# Patient Record
Sex: FEMALE | Race: ASIAN | NOT HISPANIC OR LATINO | URBAN - METROPOLITAN AREA
[De-identification: names, ages, dates, MRNs, and addresses within clinical notes are randomized per-mention and may not be internally consistent; named-entity substitution may affect disease eponyms.]

---

## 2017-07-19 ENCOUNTER — OFFICE VISIT (OUTPATIENT)
Dept: FAMILY MEDICINE | Facility: CLINIC | Age: 37
End: 2017-07-19
Payer: COMMERCIAL

## 2017-07-19 VITALS
HEIGHT: 63 IN | OXYGEN SATURATION: 98 % | SYSTOLIC BLOOD PRESSURE: 96 MMHG | RESPIRATION RATE: 18 BRPM | BODY MASS INDEX: 21.17 KG/M2 | DIASTOLIC BLOOD PRESSURE: 63 MMHG | WEIGHT: 119.5 LBS | HEART RATE: 74 BPM | TEMPERATURE: 98.2 F

## 2017-07-19 DIAGNOSIS — D17.30 LIPOMA OF SKIN AND SUBCUTANEOUS TISSUE: ICD-10-CM

## 2017-07-19 DIAGNOSIS — Z00.00 ENCOUNTER FOR ROUTINE ADULT HEALTH EXAMINATION WITHOUT ABNORMAL FINDINGS: Primary | ICD-10-CM

## 2017-07-19 DIAGNOSIS — N32.81 OVERACTIVE BLADDER: ICD-10-CM

## 2017-07-19 DIAGNOSIS — M25.511 RIGHT SHOULDER PAIN, UNSPECIFIED CHRONICITY: ICD-10-CM

## 2017-07-19 DIAGNOSIS — F41.1 GENERALIZED ANXIETY DISORDER: ICD-10-CM

## 2017-07-19 PROCEDURE — 99395 PREV VISIT EST AGE 18-39: CPT | Performed by: NURSE PRACTITIONER

## 2017-07-19 RX ORDER — TROSPIUM CHLORIDE 20 MG/1
20 TABLET, FILM COATED ORAL AT BEDTIME
Qty: 90 TABLET | Status: SHIPPED | OUTPATIENT
Start: 2017-07-19 | End: 2018-07-29

## 2017-07-19 RX ORDER — PAROXETINE 40 MG/1
40 TABLET, FILM COATED ORAL EVERY MORNING
Qty: 90 TABLET | Status: SHIPPED | OUTPATIENT
Start: 2017-07-19 | End: 2018-07-29

## 2017-07-19 RX ORDER — QUETIAPINE FUMARATE 50 MG/1
50 TABLET, FILM COATED ORAL AT BEDTIME
Qty: 120 TABLET | Status: SHIPPED | OUTPATIENT
Start: 2017-07-19 | End: 2018-07-29

## 2017-07-19 NOTE — MR AVS SNAPSHOT
After Visit Summary   7/19/2017    Mikaela Esquivel    MRN: [id number]           Patient Information     Date Of Birth          1980        Visit Information        Provider Department      7/19/2017 11:30 AM Susan Booker NP [facility]        Today's Diagnoses     Encounter for routine adult health examination without abnormal findings    -  1    GENERALIZED ANXIETY DIS        Overactive bladder        Right shoulder pain, unspecified chronicity        Lipoma of skin and subcutaneous tissue          Care Instructions    Ice shoulder for 20 minutes 3 times a day  Ibuprofen 600 mg (3 tablets) 3 times a day with food for one week.           Follow-ups after your visit        Who to contact     If you have questions or need follow up information about today's clinic visit or your schedule please contact [facility] directly at [phone].  Normal or non-critical lab and imaging results will be communicated to you by [employer], letter or phone within 4 business days after the clinic has received the results. If you do not hear from us within 7 days, please contact the clinic through [employer] or phone. If you have a critical or abnormal lab result, we will notify you by phone as soon as possible.  Submit refill requests through [employer] or call your pharmacy and they will forward the refill request to us. Please allow 3 business days for your refill to be completed.          Additional Information About Your Visit        MyChart Information     [employer] gives you secure access to your electronic health record. If you see a primary care provider, you can also send messages to your care team and make appointments. If you have questions, please call your primary care clinic.  If you do not have a primary care provider, please call [phone] and they will assist you.        Care EveryWhere ID     This is your Care EveryWhere ID. This could be used by other  "organizations to access your [place] medical records  [id number]        Your Vitals Were     Pulse Temperature Respirations Height Last Period Pulse Oximetry    74 98.2  F (36.8  C) (Oral) 18 5' 3\" (1.6 m) 06/25/2017 (Approximate) 98%    BMI (Body Mass Index)                   21.17 kg/m2            Blood Pressure from Last 3 Encounters:   07/19/17 96/63   05/04/16 100/74   07/30/15 110/77    Weight from Last 3 Encounters:   07/19/17 119 lb 8 oz (54.2 kg)   05/04/16 125 lb (56.7 kg)   07/30/15 124 lb 2 oz (56.3 kg)              Today, you had the following     No orders found for display         Where to get your medicines      These medications were sent to [place] [address] [place] [facility] [address]  [address] [facility] [address]     Phone:  [phone]     PARoxetine 40 MG tablet    trospium 20 MG tablet          Primary Care Provider Office Phone # Fax #    Susan Booker [phone]       [facility] [address] [facility] [address]        Equal Access to Services     BRENDA ARENAS : Hadii aad ku hadasho Soomaali, waaxda luqadaha, qaybta kaalmada adeegyada, waxay idiin haymunan lazarus gould . So [facility] [phone].    ATENCIÓN: Si habla español, tiene a aguillon disposición servicios gratuitos de asistencia lingüística. Llame al [phone].    We comply with applicable federal civil rights laws and [place] laws. We do not discriminate on the basis of race, color, national origin, age, disability sex, sexual orientation or gender identity.            Thank you!     Thank you for choosing [facility]  for your care. Our goal is always to provide you with excellent care. Hearing back from our patients is one way we can continue to improve our services. Please take a few minutes to complete the written survey that you may receive in the mail after your visit with us. Thank you!             Your Updated Medication List - " Protect others around you: Learn how to safely use, store and throw away your medicines at www.disposemymeds.org.          This list is accurate as of: 7/19/17 12:34 PM.  Always use your most recent med list.                   Brand Name Dispense Instructions for use Diagnosis    PARoxetine 40 MG tablet    PAXIL    90 tablet    Take 1 tablet (40 mg) by mouth every morning    Generalized anxiety disorder       SEROQUEL PO      Take by mouth At Bedtime        trospium 20 MG tablet    SANCTURA    90 tablet    Take 1 tablet (20 mg) by mouth At Bedtime    Overactive bladder

## 2017-07-19 NOTE — PATIENT INSTRUCTIONS
Ice shoulder for 20 minutes 3 times a day  Ibuprofen 600 mg (3 tablets) 3 times a day with food for one week.

## 2017-07-19 NOTE — PROGRESS NOTES
SUBJECTIVE:   CC: Mikaela Esquivel is an 37 year old woman who presents for preventive health visit.     Physical   Annual:     Getting at least 3 servings of Calcium per day::  Yes    Bi-annual eye exam::  NO    Dental care twice a year::  NO    Sleep apnea or symptoms of sleep apnea::  Daytime drowsiness    Frequency of exercise::  2-3 days/week    Duration of exercise::  45-60 minutes    Taking medications regularly::  Yes    Medication side effects::  Other    Additional concerns today::  YES    She has been having right shoulder pain for the past 6 months.  She plays ultimate frisbee.  It is more bothersome when taking off a shirt.  No change over time.  No radiating pain, paresthesias, weakness, neck pain.  Has not tried anything.    The lipoma on her back has gotten slightly larger.  It is not causing any pain.             Today's PHQ-2 Score:   PHQ-2 ( 1999 [employer]) 7/19/2017   Q1: Little interest or pleasure in doing things 0   Q2: Feeling down, depressed or hopeless 1   PHQ-2 Score 1   Q1: Little interest or pleasure in doing things Not at all   Q2: Feeling down, depressed or hopeless Several days   PHQ-2 Score 1       Abuse: Current or Past(Physical, Sexual or Emotional)- No  Do you feel safe in your environment - Yes    Social History   Substance Use Topics     Smoking status: Never Smoker     Smokeless tobacco: Never Used     Alcohol use Yes      Comment: occasionally     The patient does not drink >3 drinks per day nor >7 drinks per week.    Reviewed orders with patient.  Reviewed health maintenance and updated orders accordingly - Yes          Pertinent mammograms are reviewed under the imaging tab.  History of abnormal Pap smear: NO - age 30- 65 PAP every 3 years recommended    Reviewed and updated as needed this visit by clinical staff  Tobacco  Allergies  Meds  Med Hx  Surg Hx  Fam Hx  Soc Hx        Reviewed and updated as needed this visit by Provider              ROS:  C: NEGATIVE for  "fever, chills, change in weight  I: NEGATIVE for worrisome rashes, moles or lesions  E: NEGATIVE for vision changes or irritation  ENT: NEGATIVE for ear, mouth and throat problems  R: NEGATIVE for significant cough or SOB  B: NEGATIVE for masses, tenderness or discharge  CV: NEGATIVE for chest pain, palpitations or peripheral edema  GI: NEGATIVE for nausea, abdominal pain, heartburn, or change in bowel habits  : NEGATIVE for unusual urinary or vaginal symptoms. Periods are regular.  MUSCULOSKELETAL:see HPI  N: NEGATIVE for weakness, dizziness or paresthesias  P: NEGATIVE for changes in mood or affect     OBJECTIVE:   BP 96/63  Pulse 74  Temp 98.2  F (36.8  C) (Oral)  Resp 18  Ht 5' 3\" (1.6 m)  Wt 119 lb 8 oz (54.2 kg)  LMP 06/25/2017 (Approximate)  SpO2 98%  BMI 21.17 kg/m2  EXAM:  GENERAL: healthy, alert and no distress  EYES: Eyes grossly normal to inspection, PERRL and conjunctivae and sclerae normal  HENT: ear canals and TM's normal, nose and mouth without ulcers or lesions  NECK: no adenopathy, no asymmetry, masses, or scars and thyroid normal to palpation  RESP: lungs clear to auscultation - no rales, rhonchi or wheezes  BREAST: normal without masses, tenderness or nipple discharge and no palpable axillary masses or adenopathy  CV: regular rate and rhythm, normal S1 S2, no S3 or S4, no murmur, click or rub, no peripheral edema and peripheral pulses strong  ABDOMEN: soft, nontender, no hepatosplenomegaly, no masses and bowel sounds normal  MS: mild tenderness of anterior right shoulder, normal strength, pain with internal rotation  SKIN: fluctuant mass left upper back, approx 6-7 cm in diameter, consistent with a lipoma  NEURO: Normal strength and tone, mentation intact and speech normal  PSYCH: mentation appears normal, affect normal/bright    ASSESSMENT/PLAN:   1. Encounter for routine adult health examination without abnormal findings      2. GENERALIZED ANXIETY DIS  The current medical regimen is " "effective;  continue present plan and medications.   - PARoxetine (PAXIL) 40 MG tablet; Take 1 tablet (40 mg) by mouth every morning  Dispense: 90 tablet; Refill: PRN  - QUEtiapine (SEROQUEL) 50 MG tablet; Take 1 tablet (50 mg) by mouth At Bedtime  Dispense: 120 tablet; Refill: PRN    3. Overactive bladder  Refills given.   - trospium (SANCTURA) 20 MG tablet; Take 1 tablet (20 mg) by mouth At Bedtime  Dispense: 90 tablet; Refill: PRN    4. Right shoulder pain, unspecified chronicity  Ibuprofen 600mg TID for 7-10 days and ice area for 20 minutes TID   She will call if she would like a referral to PT.    5. Lipoma of skin and subcutaneous tissue  Discussed the option of a surgeon referral, which she declines.  If size rapidly enlarges, or causes pain, she will follow up.       COUNSELING:  Reviewed preventive health counseling, as reflected in patient instructions         reports that she has never smoked. She has never used smokeless tobacco.    Estimated body mass index is 21.17 kg/(m^2) as calculated from the following:    Height as of this encounter: 5' 3\" (1.6 m).    Weight as of this encounter: 119 lb 8 oz (54.2 kg).         Counseling Resources:  ATP IV Guidelines  Pooled Cohorts Equation Calculator  Breast Cancer Risk Calculator  FRAX Risk Assessment  ICSI Preventive Guidelines  Dietary Guidelines for Americans, 2010  USDA's MyPlate  ASA Prophylaxis  Lung CA Screening    Susan Booker NP  [facility] for HPI/ROS submitted by the patient on 7/19/2017   PHQ-2 Score: 1    "

## 2017-07-19 NOTE — NURSING NOTE
"Chief Complaint   Patient presents with     Physical     not fasting      Shoulder Pain     R shoulder x 6 months      Forms       Initial BP 96/63  Pulse 74  Temp 98.2  F (36.8  C) (Oral)  Resp 18  Ht 5' 3\" (1.6 m)  Wt 119 lb 8 oz (54.2 kg)  LMP 06/25/2017 (Approximate)  SpO2 98%  BMI 21.17 kg/m2 Estimated body mass index is 21.17 kg/(m^2) as calculated from the following:    Height as of this encounter: 5' 3\" (1.6 m).    Weight as of this encounter: 119 lb 8 oz (54.2 kg).  Medication Reconciliation: complete       Camilla Baron MA      "

## 2018-07-29 DIAGNOSIS — N32.81 OVERACTIVE BLADDER: ICD-10-CM

## 2018-07-29 DIAGNOSIS — F41.1 GENERALIZED ANXIETY DISORDER: ICD-10-CM

## 2018-07-29 NOTE — TELEPHONE ENCOUNTER
"Requested Prescriptions   Pending Prescriptions Disp Refills     PARoxetine (PAXIL) 40 MG tablet [Pharmacy Med Name: PAROXETINE 40MG TABLETS]  Last Written Prescription Date:  7/19/17  Last Fill Quantity: 90,  # refills: PRN   Last office visit: 7/19/2017 with prescribing provider:     Future Office Visit:     90 tablet 0     Sig: TAKE 1 TABLET BY MOUTH EVERY MORNING    SSRIs Protocol Failed    7/29/2018  3:24 AM       Failed - Recent (12 mo) or future (30 days) visit within the authorizing provider's specialty    Patient had office visit in the last 12 months or has a visit in the next 30 days with authorizing provider or within the authorizing provider's specialty.  See \"Patient Info\" tab in inbasket, or \"Choose Columns\" in Meds & Orders section of the refill encounter.           Passed - Patient is age 18 or older       Passed - No active pregnancy on record       Passed - No positive pregnancy test in last 12 months        trospium (SANCTURA) 20 MG tablet [Pharmacy Med Name: TROSPIUM CL 20MG TABLETS]  Last Written Prescription Date:  7/19/17  Last Fill Quantity: 90,  # refills: PRN   Last office visit: 7/19/2017 with prescribing provider:     Future Office Visit:   90 tablet 0     Sig: TAKE 1 TABLET BY MOUTH EVERY NIGHT AT BEDTIME    Muscarinic Antagonists (Urinary Incontinence Agents) Failed    7/29/2018  3:24 AM       Failed - Recent (12 mo) or future (30 days) visit within the authorizing provider's specialty    Patient had office visit in the last 12 months or has a visit in the next 30 days with authorizing provider or within the authorizing provider's specialty.  See \"Patient Info\" tab in inbasket, or \"Choose Columns\" in Meds & Orders section of the refill encounter.         Passed - Patient does not have a diagnosis of glaucoma on the problem list    If glaucoma diagnosis is new, refer refill to physician.         Passed - Patient is 18 years of age or older        QUEtiapine (SEROQUEL) 50 MG tablet " "[Pharmacy Med Name: QUETIAPINE 50MG TABLETS]  Last Written Prescription Date:  7/19/17  Last Fill Quantity: 120,  # refills: PRN   Last office visit: 7/19/2017 with prescribing provider:     Future Office Visit:   30 tablet 0     Sig: TAKE 1 TABLET BY MOUTH EVERY NIGHT AT BEDTIME    Antipsychotic Medications Failed    7/29/2018  3:24 AM       Failed - Blood pressure under 140/90 in past 12 months    BP Readings from Last 3 Encounters:   07/19/17 96/63   05/04/16 100/74   07/30/15 110/77          Failed - Lipid panel on file within the past 12 months    Recent Labs   Lab Test  06/25/14   1332   CHOL  189   TRIG  109   HDL  64   LDL  103   VLDL  22   CHOLHDLRATIO  2.9          Failed - CBC on file in past 12 months    No lab results found.    For [facility] ONLY: [id number] = WBC, [id number] = RBC         Failed - Heart Rate on file within past 12 months    Pulse Readings from Last 3 Encounters:   07/19/17 74   05/04/16 83   07/30/15 64          Failed - A1c or Glucose on file in past 12 months    No lab results found.  Please review patients last 3 weights. If a weight gain of >10 lbs exists, you may refill the prescription once after instructing the patient to schedule an appointment within the next 30 days.  Wt Readings from Last 3 Encounters:   07/19/17 119 lb 8 oz (54.2 kg)   05/04/16 125 lb (56.7 kg)   07/30/15 124 lb 2 oz (56.3 kg)          Failed - Recent (6 mo) or future (30 days) visit within the authorizing provider's specialty    Patient had office visit in the last 6 months or has a visit in the next 30 days with authorizing provider or within the authorizing provider's specialty.  See \"Patient Info\" tab in inbasket, or \"Choose Columns\" in Meds & Orders section of the refill encounter.         Passed - Patient is 12 years of age or older       Passed - Patient is not pregnant       Passed - No positve pregnancy test on file in past 12 months          "

## 2018-08-01 RX ORDER — PAROXETINE 40 MG/1
TABLET, FILM COATED ORAL
Qty: 30 TABLET | Refills: 0 | Status: SHIPPED | OUTPATIENT
Start: 2018-08-01 | End: 2018-09-04

## 2018-08-01 RX ORDER — TROSPIUM CHLORIDE 20 MG/1
TABLET, FILM COATED ORAL
Qty: 30 TABLET | Refills: 0 | Status: SHIPPED | OUTPATIENT
Start: 2018-08-01 | End: 2018-09-04

## 2018-08-01 RX ORDER — QUETIAPINE FUMARATE 50 MG/1
TABLET, FILM COATED ORAL
Qty: 30 TABLET | Refills: 0 | Status: SHIPPED | OUTPATIENT
Start: 2018-08-01 | End: 2018-09-04

## 2018-11-22 DIAGNOSIS — N32.81 OVERACTIVE BLADDER: ICD-10-CM

## 2018-11-23 NOTE — TELEPHONE ENCOUNTER
"Requested Prescriptions   Pending Prescriptions Disp Refills     trospium (SANCTURA) 20 MG tablet [Pharmacy Med Name: TROSPIUM CL 20MG TABLETS] 30 tablet 0      Last Written Prescription Date:  9/05/2018                                 Last Fill Quantity:30,  # refills: 3   Last Office Visit: 7/19/2017   Future Office Visit:    Next 5 appointments (look out 90 days)     Jan 07, 2019  8:30 AM CST   Office Visit with Susan Booker NP   [facility] ([address] [id number] [phone]                  Sig: TAKE 1 TABLET(20 MG) BY MOUTH AT BEDTIME    Muscarinic Antagonists (Urinary Incontinence Agents) Failed    11/22/2018  9:07 AM       Failed - Recent (12 mo) or future (30 days) visit within the authorizing provider's specialty    Patient had office visit in the last 12 months or has a visit in the next 30 days with authorizing provider or within the authorizing provider's specialty.  See \"Patient Info\" tab in inbasket, or \"Choose Columns\" in Meds & Orders section of the refill encounter.             Passed - Patient does not have a diagnosis of glaucoma on the problem list    If glaucoma diagnosis is new, refer refill to physician.         Passed - Patient is 18 years of age or older          "

## 2018-11-25 RX ORDER — TROSPIUM CHLORIDE 20 MG/1
TABLET, FILM COATED ORAL
Qty: 30 TABLET | Refills: 0 | OUTPATIENT
Start: 2018-11-25

## 2019-01-07 ENCOUNTER — OFFICE VISIT (OUTPATIENT)
Dept: FAMILY MEDICINE | Facility: CLINIC | Age: 39
End: 2019-01-07
Payer: COMMERCIAL

## 2019-01-07 ENCOUNTER — RESULT FOLLOW UP (OUTPATIENT)
Dept: FAMILY MEDICINE | Facility: CLINIC | Age: 39
End: 2019-01-07

## 2019-01-07 ENCOUNTER — TELEPHONE (OUTPATIENT)
Dept: FAMILY MEDICINE | Facility: CLINIC | Age: 39
End: 2019-01-07

## 2019-01-07 VITALS
HEART RATE: 97 BPM | SYSTOLIC BLOOD PRESSURE: 96 MMHG | BODY MASS INDEX: 20.51 KG/M2 | WEIGHT: 120.13 LBS | DIASTOLIC BLOOD PRESSURE: 74 MMHG | HEIGHT: 64 IN | OXYGEN SATURATION: 97 % | TEMPERATURE: 98.9 F

## 2019-01-07 DIAGNOSIS — F41.1 GENERALIZED ANXIETY DISORDER: ICD-10-CM

## 2019-01-07 DIAGNOSIS — J35.8 CRYPTIC TONSIL: ICD-10-CM

## 2019-01-07 DIAGNOSIS — D17.30 LIPOMA OF SKIN AND SUBCUTANEOUS TISSUE: ICD-10-CM

## 2019-01-07 DIAGNOSIS — N32.81 OVERACTIVE BLADDER: ICD-10-CM

## 2019-01-07 DIAGNOSIS — Z00.00 ENCOUNTER FOR ROUTINE ADULT HEALTH EXAMINATION WITHOUT ABNORMAL FINDINGS: Primary | ICD-10-CM

## 2019-01-07 DIAGNOSIS — R87.810 CERVICAL HIGH RISK HPV (HUMAN PAPILLOMAVIRUS) TEST POSITIVE: ICD-10-CM

## 2019-01-07 PROCEDURE — G0145 SCR C/V CYTO,THINLAYER,RESCR: HCPCS | Performed by: NURSE PRACTITIONER

## 2019-01-07 PROCEDURE — 99395 PREV VISIT EST AGE 18-39: CPT | Performed by: NURSE PRACTITIONER

## 2019-01-07 PROCEDURE — 87624 HPV HI-RISK TYP POOLED RSLT: CPT | Performed by: NURSE PRACTITIONER

## 2019-01-07 RX ORDER — PAROXETINE 40 MG/1
40 TABLET, FILM COATED ORAL DAILY
Qty: 30 TABLET | Status: SHIPPED | OUTPATIENT
Start: 2019-01-07 | End: 2020-02-04

## 2019-01-07 RX ORDER — QUETIAPINE FUMARATE 50 MG/1
50 TABLET, FILM COATED ORAL AT BEDTIME
Qty: 30 TABLET | Status: SHIPPED | OUTPATIENT
Start: 2019-01-07 | End: 2020-02-04

## 2019-01-07 RX ORDER — TROSPIUM CHLORIDE 20 MG/1
20 TABLET, FILM COATED ORAL AT BEDTIME
Qty: 30 TABLET | Status: SHIPPED | OUTPATIENT
Start: 2019-01-07 | End: 2020-02-04

## 2019-01-07 ASSESSMENT — ENCOUNTER SYMPTOMS
DIARRHEA: 0
ABDOMINAL PAIN: 0
FEVER: 0
NAUSEA: 0
FREQUENCY: 1
EYE PAIN: 0
SORE THROAT: 0
HEMATURIA: 0
CHILLS: 0
BREAST MASS: 0
DYSURIA: 0
HEMATOCHEZIA: 0
PARESTHESIAS: 0
CONSTIPATION: 0
ARTHRALGIAS: 0
SHORTNESS OF BREATH: 0
HEADACHES: 0
WEAKNESS: 0
HEARTBURN: 0
JOINT SWELLING: 0
COUGH: 0
MYALGIAS: 0
NERVOUS/ANXIOUS: 0
DIZZINESS: 0
PALPITATIONS: 0

## 2019-01-07 ASSESSMENT — MIFFLIN-ST. JEOR: SCORE: 1211.94

## 2019-01-07 NOTE — TELEPHONE ENCOUNTER
Prior Authorization Retail Medication Request    Medication/Dose: trospium (SANCTURA) 20 MG tablet  ICD code (if different than what is on RX):  Previously Tried and Failed:  Rationale:    Insurance Name:    Insurance ID: [id number]    Pharmacy Information (if different than what is on RX)  Name:  Phone:    Please include previous medications tried and failed.  Please ask insurance for medications on formulary.

## 2019-01-07 NOTE — LETTER
January 21, 2019      Mikaela Esquivel  [address]     [facility] [address]    Dear ,      This letter is in regards to the PAP smear and HPV (Human Papillomavirus) test you had done recently. Your PAP test result is normal, but your HPV (Human Papillomavirus) test was positive.     About 80 percent of women have been exposed to HPV virus throughout their lifetime. There is no medication for the treatment of HPV. Typically your own immune system gets rid of the virus before it does harm. HPV is spread by direct skin-to-skin contact, including sexual intercourse, oral sex, anal sex, or any other contact involving the genital area (example: hand to genital contact). It is not possible to become infected with HPV by touching an object, such as a toilet seat. Most people who are infected with HPV have no signs or symptoms.    Things that you can do to boost your immune system and help your body get rid of HPV: get plenty of rest, eat a well-balanced diet of healthy foods, stop smoking, exercise regularly and decrease stress.    Please return in 1 year to repeat your pap smear and HPV test.     If you have additional questions regarding this result, please call our registered nurse, Tarsha at [phone].    Sincerely,      Susan Booker NP/[facility]

## 2019-01-07 NOTE — PROGRESS NOTES
SUBJECTIVE:   CC: Mikaela Esquivel is an 38 year old woman who presents for preventive health visit.     Physical   Annual:     Getting at least 3 servings of Calcium per day:  Yes    Bi-annual eye exam:  NO    Dental care twice a year:  Yes    Sleep apnea or symptoms of sleep apnea:  Daytime drowsiness    Diet:  Regular (no restrictions)    Frequency of exercise:  1 day/week    Duration of exercise:  15-30 minutes    Taking medications regularly:  Yes    Medication side effects:  Other    Additional concerns today:  Yes    PHQ-2 Total Score: 0    Left hand pinkie pain  Duration: 1 month     Fingers and toes are usually cold    Pt had a concussion about 1 1/2 years ago.  She does get occasional headaches.  No vision changes, dizziness, weakness, nausea or vomiting.    Left shoulder lipoma getting bigger     Update/discuss diagnosis for cardiovascular screening on problem list    Question about tonsils.  Still getting food caught in them, needs to try to manually remove it, smelly.                 Today's PHQ-2 Score:   PHQ-2 ( 1999 Pfizer) 1/7/2019   Q1: Little interest or pleasure in doing things 0   Q2: Feeling down, depressed or hopeless 0   PHQ-2 Score 0   Q1: Little interest or pleasure in doing things Not at all   Q2: Feeling down, depressed or hopeless Not at all   PHQ-2 Score 0       Abuse: Current or Past(Physical, Sexual or Emotional)- No  Do you feel safe in your environment? Yes    Social History     Tobacco Use     Smoking status: Never Smoker     Smokeless tobacco: Never Used   Substance Use Topics     Alcohol use: Yes     Comment: occasionally     Alcohol Use 1/7/2019   If you drink alcohol do you typically have greater than 3 drinks per day OR greater than 7 drinks per week? No       Reviewed orders with patient.  Reviewed health maintenance and updated orders accordingly - Yes          Pertinent mammograms are reviewed under the imaging tab.  History of abnormal Pap smear: NO - age 30- 65 PAP  "every 3 years recommended  PAP / HPV Latest Ref Rng & Units 5/4/2016 7/30/2015 1/3/2013   PAP - NIL NIL ASC-US(A)   HPV 16 DNA NEG Negative Negative -   HPV 18 DNA NEG Negative Negative -   OTHER HR HPV NEG Negative Positive(A) -     Reviewed and updated as needed this visit by clinical staff  Allergies         Reviewed and updated as needed this visit by Provider            Review of Systems   Constitutional: Negative for chills and fever.   HENT: Negative for congestion, ear pain, hearing loss and sore throat.    Eyes: Negative for pain and visual disturbance.   Respiratory: Negative for cough and shortness of breath.    Cardiovascular: Negative for chest pain, palpitations and peripheral edema.   Gastrointestinal: Negative for abdominal pain, constipation, diarrhea, heartburn, hematochezia and nausea.   Breasts:  Positive for tenderness. Negative for breast mass and discharge.   Genitourinary: Positive for frequency. Negative for dysuria, genital sores, hematuria, pelvic pain, urgency, vaginal bleeding and vaginal discharge.   Musculoskeletal: Negative for arthralgias, joint swelling and myalgias.   Skin: Negative for rash.   Neurological: Negative for dizziness, weakness, headaches and paresthesias.   Psychiatric/Behavioral: Negative for mood changes. The patient is not nervous/anxious.           OBJECTIVE:   BP 96/74   Pulse 97   Temp 98.9  F (37.2  C) (Oral)   Ht 1.613 m (5' 3.5\")   Wt 54.5 kg (120 lb 2 oz)   SpO2 97%   BMI 20.95 kg/m    Physical Exam  GENERAL: healthy, alert and no distress  EYES: Eyes grossly normal to inspection, PERRL and conjunctivae and sclerae normal  HENT: ear canals and TM's normal, nose and mouth without ulcers or lesions; large cryptic tonsils  NECK: no adenopathy, no asymmetry, masses, or scars and thyroid normal to palpation  RESP: lungs clear to auscultation - no rales, rhonchi or wheezes  BREAST: normal without masses, tenderness or nipple discharge and no palpable " "axillary masses or adenopathy  CV: regular rate and rhythm, normal S1 S2, no S3 or S4, no murmur, click or rub, no peripheral edema and peripheral pulses strong  ABDOMEN: soft, nontender, no hepatosplenomegaly, no masses and bowel sounds normal   (female): normal female external genitalia, normal urethral meatus, vaginal mucosa pink, moist, well rugated, and normal cervix/adnexa/uterus without masses or discharge  MS: no gross musculoskeletal defects noted, no edema  SKIN: subcutaneous mass left scapular area  NEURO: Normal strength and tone, mentation intact and speech normal  PSYCH: mentation appears normal, affect normal/bright        ASSESSMENT/PLAN:   1. Encounter for routine adult health examination without abnormal findings    - Pap imaged thin layer screen with HPV - recommended age 30 - 65 years (select HPV order below)  - HPV High Risk Types DNA Cervical    2. GENERALIZED ANXIETY DIS  The current medical regimen is effective;  continue present plan and medications.   Change Paxil to bedtime to see if this helps with fatigue.   - PARoxetine (PAXIL) 40 MG tablet; Take 1 tablet (40 mg) by mouth daily  Dispense: 30 tablet; Refill: PRN  - QUEtiapine (SEROQUEL) 50 MG tablet; Take 1 tablet (50 mg) by mouth At Bedtime  Dispense: 30 tablet; Refill: PRN    3. Overactive bladder  Refills given.   - trospium (SANCTURA) 20 MG tablet; Take 1 tablet (20 mg) by mouth At Bedtime  Dispense: 30 tablet; Refill: PRN    4. Lipoma of skin and subcutaneous tissue  Will refer to surgery for consult since lipoma is enlarging.   - GENERAL SURG ADULT REFERRAL    5. Cryptic tonsil  Will refer to ENT.   - OTOLARYNGOLOGY REFERRAL    COUNSELING:  Reviewed preventive health counseling, as reflected in patient instructions    BP Readings from Last 1 Encounters:   01/07/19 96/74     Estimated body mass index is 20.95 kg/m  as calculated from the following:    Height as of this encounter: 1.613 m (5' 3.5\").    Weight as of this encounter: " 54.5 kg (120 lb 2 oz).           reports that  has never smoked. she has never used smokeless tobacco.      Counseling Resources:  ATP IV Guidelines  Pooled Cohorts Equation Calculator  Breast Cancer Risk Calculator  FRAX Risk Assessment  ICSI Preventive Guidelines  Dietary Guidelines for Americans, 2010  USDA's MyPlate  ASA Prophylaxis  Lung CA Screening    Susan Booker NP  [facility]

## 2019-01-09 LAB
COPATH REPORT: NORMAL
PAP: NORMAL

## 2019-01-10 LAB
FINAL DIAGNOSIS: ABNORMAL
HPV HR 12 DNA CVX QL NAA+PROBE: POSITIVE
HPV16 DNA SPEC QL NAA+PROBE: NEGATIVE
HPV18 DNA SPEC QL NAA+PROBE: NEGATIVE
SPECIMEN DESCRIPTION: ABNORMAL
SPECIMEN SOURCE CVX/VAG CYTO: ABNORMAL

## 2019-01-11 NOTE — TELEPHONE ENCOUNTER
PA Initiation    Medication: trospium (SANCTURA) 20 MG tablet, rec 1-7-19  Insurance Company: [employer] - Phone [phone] Fax [phone]  Pharmacy Filling the Rx: [id number] [address] [place] [address] [address] AT [facility] [address]  Filling Pharmacy Phone: [phone]  Filling Pharmacy Fax:    Start Date: 1/11/2019    Central Prior Authorization Team   Phone: [phone]

## 2019-01-14 NOTE — TELEPHONE ENCOUNTER
PRIOR AUTHORIZATION DENIED    Medication: trospium (SANCTURA) 20 MG tablet, rec 1-7-19    Denial Date: 1/11/2019    Denial Rational: Patient must have a history of trial & failure to the formulary alternative(s) or have a contraindication or intolerance to the formulary alternatives: Oxybutynin IR or ER and tolterodine IR        Appeal Information:

## 2019-01-14 NOTE — TELEPHONE ENCOUNTER
PA was denied. Please order alternative med with complete SIG or begin appeal process.     If you would like to appeal:   Create letter of medical necessity or    Compile supporting clinical documentation in EPIC Telephone encounter (TE).    Route TE to: [facility]     PRIOR AUTHORIZATION DENIED     Medication: trospium (SANCTURA) 20 MG tablet, rec 1-7-19     Denial Date: 1/11/2019     Denial Rational: Patient must have a history of trial & failure to the formulary alternative(s) or have a contraindication or intolerance to the formulary alternatives: Oxybutynin IR or ER and tolterodine IR          Appeal Information:

## 2019-01-16 NOTE — PROGRESS NOTES
2013: ASCUS, neg HPV. Plan cotest pap & HPV in 3 years.  7/30/15: NIL pap, + HPV (not 16 or 18). Plan cotest pap & HPV in 1 year.   05/04/16 Dx pap= NIL, Neg HPV. Repeat co-testing 3 yrs  01/07/19: NIL pap, + HR HPV (not 16 or 18) result. Plan cotest in 1 year per provider. [facility] result letter sent to the pt via [employer]. Pt didn't view [employer] message. Tc to mail result letter.   01/21/19 Result letter sent at request of RN. (kendall)  03/03/20: NIL Pap,  + HR HPV (not 16 or 18) result. Plan [facility], due bef 06/03/20.   03/09/20: Msg left to call back. Pt was advised.  07/21/20 [facility] appt

## 2019-05-01 ENCOUNTER — OFFICE VISIT (OUTPATIENT)
Dept: FAMILY MEDICINE | Facility: CLINIC | Age: 39
End: 2019-05-01
Payer: COMMERCIAL

## 2019-05-01 VITALS
SYSTOLIC BLOOD PRESSURE: 97 MMHG | HEART RATE: 75 BPM | TEMPERATURE: 98.6 F | DIASTOLIC BLOOD PRESSURE: 66 MMHG | OXYGEN SATURATION: 99 % | RESPIRATION RATE: 16 BRPM

## 2019-05-01 DIAGNOSIS — Z71.84 TRAVEL ADVICE ENCOUNTER: Primary | ICD-10-CM

## 2019-05-01 PROCEDURE — 90691 TYPHOID VACCINE IM: CPT | Mod: GA | Performed by: NURSE PRACTITIONER

## 2019-05-01 PROCEDURE — 90471 IMMUNIZATION ADMIN: CPT | Mod: GA | Performed by: NURSE PRACTITIONER

## 2019-05-01 PROCEDURE — 90734 MENACWYD/MENACWYCRM VACC IM: CPT | Mod: GA | Performed by: NURSE PRACTITIONER

## 2019-05-01 PROCEDURE — 90472 IMMUNIZATION ADMIN EACH ADD: CPT | Mod: GA | Performed by: NURSE PRACTITIONER

## 2019-05-01 PROCEDURE — 90717 YELLOW FEVER VACCINE SUBQ: CPT | Mod: GA | Performed by: NURSE PRACTITIONER

## 2019-05-01 PROCEDURE — 99402 PREV MED CNSL INDIV APPRX 30: CPT | Mod: 25 | Performed by: NURSE PRACTITIONER

## 2019-05-01 RX ORDER — AZITHROMYCIN 500 MG/1
500 TABLET, FILM COATED ORAL DAILY
Qty: 3 TABLET | Refills: 0 | Status: SHIPPED | OUTPATIENT
Start: 2019-05-01 | End: 2019-05-04

## 2019-05-01 RX ORDER — ATOVAQUONE AND PROGUANIL HYDROCHLORIDE 250; 100 MG/1; MG/1
1 TABLET, FILM COATED ORAL DAILY
Qty: 24 TABLET | Refills: 0 | Status: SHIPPED | OUTPATIENT
Start: 2019-05-01 | End: 2020-03-03

## 2019-05-01 NOTE — PATIENT INSTRUCTIONS
Today May 1, 2019 you received the    Yellow Fever (YF)    Meningococcal (Menactra) Vaccine    Typhoid - injectable. This vaccine is valid for two years.   .    These appointments can be made as a NURSE ONLY visit.    **It is very important for the vaccinations to be given on the scheduled day(s), this helps ensure you receive the full effectiveness of the vaccine.**    Please call [facility] with any questions [phone]    Thank you for visiting [facility]

## 2019-05-01 NOTE — NURSING NOTE
"Chief Complaint   Patient presents with     Travel Clinic     [facility]      BP 97/66   Pulse 75   Temp 98.6  F (37  C) (Oral)   Resp 16   LMP 04/02/2019   SpO2 99%  Estimated body mass index is 20.95 kg/m  as calculated from the following:    Height as of 1/7/19: 1.613 m (5' 3.5\").    Weight as of 1/7/19: 54.5 kg (120 lb 2 oz).  bp completed using cuff size: regular       Health Maintenance addressed:  NONE    n/a    Kristel Gallegos MA     "

## 2019-05-01 NOTE — PROGRESS NOTES
Nurse Note      Itinerary:  [facility]      Departure Date: 5/22/2019      Return Date: 6/4/2019      Length of Trip 2 weeks      Reason for Travel: Volunteer work           Urban or rural: urban      Accommodations: Hotel        IMMUNIZATION HISTORY  Have you received any immunizations within the past 4 weeks?  No  Have you ever fainted from having your blood drawn or from an injection?  No  Have you ever had a fever reaction to vaccination?  No  Have you ever had any bad reaction or side effect from any vaccination?  No  Have you ever had hepatitis A or B vaccine?  Yes  Do you live (or work closely) with anyone who has AIDS, an AIDS-like condition, any other immune disorder or who is on chemotherapy for cancer?  No  Do you have a family history of immunodeficiency?  No  Have you received any injection of immune globulin or any blood products during the past 12 months?  No    Patient roomed by Kristel Leavittjoel Mchughalecwallace is a 39 year old female seen today alone for counsultation for international travel to [facility] for Volunteer work.  Patient will be departing in  3 week(s) and staying for   2 week(s) and  traveling 3 others.      Patient itinerary :  will be in the urban region of [facility] which presents risk for Malaria and Yellow Fever. exposure.      Patient's activities will include volunteer work.    Patient's country of birth is [place]    Special medical concerns: none  Pre-travel questionnaire was completed by patient and reviewed by provider.     Vitals: BP 97/66   Pulse 75   Temp 98.6  F (37  C) (Oral)   Resp 16   LMP 04/02/2019   SpO2 99%   BMI= There is no height or weight on file to calculate BMI.    EXAM:  General:  Well-nourished, well-developed in no acute distress.  Appears to be stated age, interacts appropriately and expresses understanding of information given to patient.    Current Outpatient Medications   Medication Sig Dispense Refill     PARoxetine (PAXIL) 40 MG tablet Take 1 tablet  (40 mg) by mouth daily 30 tablet PRN     QUEtiapine (SEROQUEL) 50 MG tablet Take 1 tablet (50 mg) by mouth At Bedtime 30 tablet PRN     trospium (SANCTURA) 20 MG tablet Take 1 tablet (20 mg) by mouth At Bedtime 30 tablet PRN     Patient Active Problem List   Diagnosis     Generalized anxiety disorder     Overactive bladder     CARDIOVASCULAR SCREENING; LDL GOAL LESS THAN 160     Lipoma of skin and subcutaneous tissue     Cervical high risk HPV (human papillomavirus) test positive     No Known Allergies      Immunizations discussed include:   Hepatitis A:  Up to date  Hepatitis B: Up to date  Influenza: Up to date  Typhoid: Ordered/given today, risks, benefits and side effects reviewed  Rabies: Declined  reviewed managment of a animal bite or scratch (washing wound, seek medical care within 24 hours for post exposure prophylaxis )  Yellow Fever: Stamaril Ordered/given today - consent completed, side effects, precautions, allergies, risks discussed. Patient expressed understanding.  Comoran Encephalitis: Not indicated  Meningococcus: Ordered/given today, risks, benefits and side effects reviewed  Tetanus/Diphtheria: Up to date  Measles/Mumps/Rubella: Up to date  Cholera: Not needed  Polio: Up to date  Pneumococcal: Under age of 65  Varicella: Immune by disease history per patient report  Zostavax:  Not indicated  Shingrix: Not indicated  HPV:  Not indicated  TB:  Low risk     Stamaril Informed Consent    The patient was provided with a copy of the IRB-approved consent form and all questions were answered before the patient agreed to participate by signing the informed consent document.   A copy of the form was provided to the patient.    Date: 5/1/2019  Consent Version Date: 5/10/2017   Consent Obtained by:  Emeli Almazan CNP (Lori)     HIPAA:  Yes  HIPAA Authorization Signed Date: 5/1/2019      Inclusion/Exclusion Criteria:    (Similar to Yellow Fever-VAX)      The patient met all of the following inclusion criteria  in order to be eligible for the Stamaril vaccination under this EAP (Expanded Access Investigational New Drug Program)           At increased risk for YF, including researchers, laboratory workers, vaccine production staff, and those who are traveling within 30 days to a YF-endemic region or to a country requiring proof of YF vaccination under IHRs (International Health Regulations)?       Yes     Patient is greater than or equal to 9 months of age on the day of vaccination?     Yes     Patient is greater than or equal to 18 years of age and signed and dated the Consent Forms?     Yes     Patient is < 18 years of age and parent(s)/guardian(s) signed and dated the Consent Forms?      Patient is 7 years to < 18 years of age and signed and dated the Assent form?        No Assent is required.  Patient is <7 years of age.     No      No      N/A     The patient did not meet any of the following criteria that would have excluded the patient from receiving the Stamaril vaccination under this EAP              Patient is less than 9 months of age.       No     The patient is breast-feeding and cannot stop nursing for at least 14 days after vaccination.    Note: Yellow Fever vaccine virus may be transmissible via breast milk by nursing mothers who are vaccinated during the final 2 weeks of pregnancy or post-partum.   Following transmission, infants may develop encephalitis.  The minimum time of discontinuation of breastfeeding for 14 days after vaccination is based on the expected clearance of live-attenuated vaccine virus.       No     The patient is immunosuppressed, whether congenital or idiopathic, including for example, leukemia, lymphoma, other malignancies, and patients who are receiving immunosuppressant medications (e.g. Systemic corticosteroids [greater than the standard dose of topical or inhaled steroids], alkylating drugs, antimetabolites, of other cytotoxic or immunomodulatory drugs) or radiation therapy or  organ transplantation.       No     The patient has known hypersensitivity to the active substance or to any of the excipients of Stamaril vaccine or to eggs or chicken proteins.     No     The patient is symptomatic for human immunodeficiency virus (HIV) infection     No     The patient is asymptomatic for HIV infection but accompanied by evidence of severe immune suppression    Note:  Evidence of severe immune suppression includes CD4+ T-cell counts < 200 cubic millimeters (or < 15% total lymphocytes in children aged < 6 years), or as determined by the health care provider.       No     The patient has a history of thymus dysfunction (including myasthenia gravis, thymoma, thymectomy)     No     Moderate or severe febrile illness or acute illness    Note: Participation in the EAP can be reassessed when moderate or severe febrile illness or acute illness has resolved.       No         Altitude Exposure on this trip: no  Past tolerance to Altitude: na    ASSESSMENT/PLAN:    ICD-10-CM    1. Travel advice encounter Z71.89 atovaquone-proguanil (MALARONE) 250-100 MG tablet     azithromycin (ZITHROMAX) 500 MG tablet     I have reviewed general recommendations for safe travel   including: food/water precautions, insect precautions, safer sex   practices given high prevalence of Zika, HIV and other STDs,   roadway safety. Educational materials and Travax report provided.    Malaraia prophylaxis recommended: Malarone  Symptomatic treatment for traveler's diarrhea: azithromycin  Altitude illness prevention and treatment: na      Evacuation insurance advised and resources were provided to patient.    Total visit time 30 minutes  with over 50% of time spent counseling patient as detailed above.    Emeli Almazan CNP

## 2019-05-01 NOTE — NURSING NOTE
Screening Questionnaire for Adult Immunization    Are you sick today?   No   Do you have allergies to medications, food, a vaccine component or latex?   No   Have you ever had a serious reaction after receiving a vaccination?   No   Do you have a long-term health problem with heart disease, lung disease, asthma, kidney disease, metabolic disease (e.g. diabetes), anemia, or other blood disorder?   No   Do you have cancer, leukemia, HIV/AIDS, or any other immune system problem?   No   In the past 3 months, have you taken medications that affect  your immune system, such as prednisone, other steroids, or anticancer drugs; drugs for the treatment of rheumatoid arthritis, Crohn s disease, or psoriasis; or have you had radiation treatments?   No   Have you had a seizure, or a brain or other nervous system problem?   No   During the past year, have you received a transfusion of blood or blood     products, or been given immune (gamma) globulin or antiviral drug?   No   For women: Are you pregnant or is there a chance you could become        pregnant during the next month?   No   Have you received any vaccinations in the past 4 weeks?   No     Immunization questionnaire answers were all negative.        Per orders of LATHA Almazan, injection of Typhoid, YF, and Menactra given by Kristel Gallegos. Patient instructed to remain in clinic for 15 minutes afterwards, and to report any adverse reaction to me immediately.       Screening performed by Kristel Gallegos on 5/1/2019 at 2:28 PM.

## 2019-10-03 ENCOUNTER — HEALTH MAINTENANCE LETTER (OUTPATIENT)
Age: 39
End: 2019-10-03

## 2020-02-04 ENCOUNTER — MYC REFILL (OUTPATIENT)
Dept: FAMILY MEDICINE | Facility: CLINIC | Age: 40
End: 2020-02-04

## 2020-02-04 DIAGNOSIS — F41.1 GENERALIZED ANXIETY DISORDER: ICD-10-CM

## 2020-02-04 DIAGNOSIS — N32.81 OVERACTIVE BLADDER: ICD-10-CM

## 2020-02-10 RX ORDER — TROSPIUM CHLORIDE 20 MG/1
20 TABLET, FILM COATED ORAL AT BEDTIME
Qty: 30 TABLET | Refills: 0 | Status: SHIPPED | OUTPATIENT
Start: 2020-02-10 | End: 2020-03-03

## 2020-02-10 RX ORDER — QUETIAPINE FUMARATE 50 MG/1
50 TABLET, FILM COATED ORAL AT BEDTIME
Qty: 30 TABLET | Refills: 0 | Status: SHIPPED | OUTPATIENT
Start: 2020-02-10 | End: 2020-03-03

## 2020-02-10 RX ORDER — PAROXETINE 40 MG/1
40 TABLET, FILM COATED ORAL DAILY
Qty: 30 TABLET | Refills: 0 | Status: SHIPPED | OUTPATIENT
Start: 2020-02-10 | End: 2020-03-03

## 2020-02-10 NOTE — TELEPHONE ENCOUNTER
"Requested Prescriptions   Pending Prescriptions Disp Refills     PARoxetine (PAXIL) 40 MG tablet 30 tablet PRN     Sig: Take 1 tablet (40 mg) by mouth daily       SSRIs Protocol Failed - 2/4/2020  7:28 PM        Failed - Recent (12 mo) or future (30 days) visit within the authorizing provider's specialty     Patient has had an office visit with the authorizing provider or a provider within the authorizing providers department within the previous 12 mos or has a future within next 30 days. See \"Patient Info\" tab in inbasket, or \"Choose Columns\" in Meds & Orders section of the refill encounter.              Passed - Medication is active on med list        Passed - Patient is age 18 or older        Passed - No active pregnancy on record        Passed - No positive pregnancy test in last 12 months        QUEtiapine (SEROQUEL) 50 MG tablet 30 tablet PRN     Sig: Take 1 tablet (50 mg) by mouth At Bedtime       Antipsychotic Medications Failed - 2/4/2020  7:28 PM        Failed - Lipid panel on file within the past 12 months     Recent Labs   Lab Test 06/25/14  1332   CHOL 189   TRIG 109   HDL 64      VLDL 22   CHOLHDLRATIO 2.9               Failed - CBC on file in past 12 months     No lab results found.              Failed - A1c or Glucose on file in past 12 months     No lab results found.    Please review patients last 3 weights. If a weight gain of >10 lbs exists, you may refill the prescription once after instructing the patient to schedule an appointment within the next 30 days.    Wt Readings from Last 3 Encounters:   01/07/19 54.5 kg (120 lb 2 oz)   07/19/17 54.2 kg (119 lb 8 oz)   05/04/16 56.7 kg (125 lb)             Failed - Recent (6 mo) or future (30 days) visit within the authorizing provider's specialty     Patient had office visit in the last 6 months or has a visit in the next 30 days with authorizing provider or within the authorizing provider's specialty.  See \"Patient Info\" tab in inbasket, or " "\"Choose Columns\" in Meds & Orders section of the refill encounter.            Passed - Blood pressure under 140/90 in past 12 months     BP Readings from Last 3 Encounters:   05/01/19 97/66   01/07/19 96/74   07/19/17 96/63                 Passed - Patient is 12 years of age or older        Passed - Heart Rate on file within past 12 months     Pulse Readings from Last 3 Encounters:   05/01/19 75   01/07/19 97   07/19/17 74               Passed - Medication is active on med list        Passed - Patient is not pregnant        Passed - No positve pregnancy test on file in past 12 months        trospium (SANCTURA) 20 MG tablet 30 tablet PRN     Sig: Take 1 tablet (20 mg) by mouth At Bedtime       Muscarinic Antagonists (Urinary Incontinence Agents) Failed - 2/4/2020  7:28 PM        Failed - Recent (12 mo) or future (30 days) visit within the authorizing provider's specialty     Patient has had an office visit with the authorizing provider or a provider within the authorizing providers department within the previous 12 mos or has a future within next 30 days. See \"Patient Info\" tab in inbasket, or \"Choose Columns\" in Meds & Orders section of the refill encounter.              Passed - Patient does not have a diagnosis of glaucoma on the problem list     If glaucoma diagnosis is new, refer refill to physician.          Passed - Medication is active on med list        Passed - Patient is 18 years of age or older          Medication is being filled for 1 time refill only due to:  Patient needs to be seen because it has been more than one year since last visit.     Signed Prescriptions:                        Disp   Refills    PARoxetine (PAXIL) 40 MG tablet            30 tab*0        Sig: Take 1 tablet (40 mg) by mouth daily  Authorizing Provider: JOSE GUADALUPE VILLEGAS  Ordering User: MERCED EM    QUEtiapine (SEROQUEL) 50 MG tablet         30 tab*0        Sig: Take 1 tablet (50 mg) by mouth At Bedtime  Authorizing " Provider: JOSE GUADALUPE VILLEGAS  Ordering User: MERCED EM    trospium (SANCTURA) 20 MG tablet           30 tab*0        Sig: Take 1 tablet (20 mg) by mouth At Bedtime  Authorizing Provider: JOSE GUADALUPE VILLEGAS  Ordering User: MERCED EM      Sent petrat reminder

## 2020-03-03 ENCOUNTER — OFFICE VISIT (OUTPATIENT)
Dept: FAMILY MEDICINE | Facility: CLINIC | Age: 40
End: 2020-03-03
Payer: COMMERCIAL

## 2020-03-03 VITALS
OXYGEN SATURATION: 97 % | TEMPERATURE: 98.2 F | WEIGHT: 120.13 LBS | DIASTOLIC BLOOD PRESSURE: 79 MMHG | BODY MASS INDEX: 20.51 KG/M2 | HEART RATE: 75 BPM | SYSTOLIC BLOOD PRESSURE: 102 MMHG | RESPIRATION RATE: 16 BRPM | HEIGHT: 64 IN

## 2020-03-03 DIAGNOSIS — Z00.00 ROUTINE GENERAL MEDICAL EXAMINATION AT A HEALTH CARE FACILITY: Primary | ICD-10-CM

## 2020-03-03 DIAGNOSIS — R87.810 CERVICAL HIGH RISK HPV (HUMAN PAPILLOMAVIRUS) TEST POSITIVE: ICD-10-CM

## 2020-03-03 DIAGNOSIS — N32.81 OVERACTIVE BLADDER: ICD-10-CM

## 2020-03-03 DIAGNOSIS — F41.1 GENERALIZED ANXIETY DISORDER: ICD-10-CM

## 2020-03-03 LAB
CHOLEST SERPL-MCNC: 203 MG/DL
GLUCOSE SERPL-MCNC: 104 MG/DL (ref 70–99)
HDLC SERPL-MCNC: 60 MG/DL
LDLC SERPL CALC-MCNC: 112 MG/DL
NONHDLC SERPL-MCNC: 143 MG/DL
TRIGL SERPL-MCNC: 157 MG/DL

## 2020-03-03 PROCEDURE — 82947 ASSAY GLUCOSE BLOOD QUANT: CPT | Performed by: NURSE PRACTITIONER

## 2020-03-03 PROCEDURE — 80061 LIPID PANEL: CPT | Performed by: NURSE PRACTITIONER

## 2020-03-03 PROCEDURE — 36415 COLL VENOUS BLD VENIPUNCTURE: CPT | Performed by: NURSE PRACTITIONER

## 2020-03-03 PROCEDURE — 99395 PREV VISIT EST AGE 18-39: CPT | Performed by: NURSE PRACTITIONER

## 2020-03-03 PROCEDURE — 87624 HPV HI-RISK TYP POOLED RSLT: CPT | Performed by: NURSE PRACTITIONER

## 2020-03-03 PROCEDURE — G0145 SCR C/V CYTO,THINLAYER,RESCR: HCPCS | Performed by: NURSE PRACTITIONER

## 2020-03-03 PROCEDURE — 99213 OFFICE O/P EST LOW 20 MIN: CPT | Mod: 25 | Performed by: NURSE PRACTITIONER

## 2020-03-03 RX ORDER — PAROXETINE 40 MG/1
40 TABLET, FILM COATED ORAL DAILY
Qty: 90 TABLET | Refills: 3 | Status: SHIPPED | OUTPATIENT
Start: 2020-03-03 | End: 2021-01-01

## 2020-03-03 RX ORDER — TROSPIUM CHLORIDE 20 MG/1
20 TABLET, FILM COATED ORAL AT BEDTIME
Qty: 90 TABLET | Refills: 3 | Status: SHIPPED | OUTPATIENT
Start: 2020-03-03 | End: 2021-01-01

## 2020-03-03 RX ORDER — QUETIAPINE FUMARATE 50 MG/1
50 TABLET, FILM COATED ORAL AT BEDTIME
Qty: 90 TABLET | Refills: 3 | Status: SHIPPED | OUTPATIENT
Start: 2020-03-03 | End: 2021-01-01

## 2020-03-03 ASSESSMENT — ENCOUNTER SYMPTOMS
COUGH: 0
DIARRHEA: 0
FREQUENCY: 1
EYE PAIN: 0
FEVER: 0
CONSTIPATION: 0
DIZZINESS: 0
NERVOUS/ANXIOUS: 0
HEMATURIA: 0
ABDOMINAL PAIN: 0
HEMATOCHEZIA: 0
CHILLS: 0

## 2020-03-03 ASSESSMENT — MIFFLIN-ST. JEOR: SCORE: 1201.94

## 2020-03-03 NOTE — PROGRESS NOTES
SUBJECTIVE:   CC: Mikaela Esquivel is an 40 year old woman who presents for preventive health visit.     Healthy Habits:     Getting at least 3 servings of Calcium per day:  Yes    Bi-annual eye exam:  NO    Dental care twice a year:  Yes    Sleep apnea or symptoms of sleep apnea:  Daytime drowsiness    Diet:  Regular (no restrictions)    Frequency of exercise:  2-3 days/week    Duration of exercise:  30-45 minutes    Taking medications regularly:  Yes    Medication side effects:  Other    PHQ-2 Total Score: 0    Additional concerns today:  No    She is doing well on her current dose of Paxil, anxiety is well-controlled.  She does have some fatigue.  She uses Seroquel at bedtime for insomnia, which works well.  She does not feel groggy in the morning.    She just restarted on [place] for OAB, since her insurance last year did not cover it.         Today's PHQ-2 Score:   PHQ-2 ( 1999 Pfizer) 3/3/2020   Q1: Little interest or pleasure in doing things 0   Q2: Feeling down, depressed or hopeless 0   PHQ-2 Score 0   Q1: Little interest or pleasure in doing things Not at all   Q2: Feeling down, depressed or hopeless Not at all   PHQ-2 Score 0       Abuse: Current or Past(Physical, Sexual or Emotional)- No  Do you feel safe in your environment? Yes        Social History     Tobacco Use     Smoking status: Never Smoker     Smokeless tobacco: Never Used   Substance Use Topics     Alcohol use: Yes     Comment: occasionally: 2 per week          Alcohol Use 3/3/2020   Prescreen: >3 drinks/day or >7 drinks/week? No   Prescreen: >3 drinks/day or >7 drinks/week? -       Reviewed orders with patient.  Reviewed health maintenance and updated orders accordingly - Yes          Pertinent mammograms are reviewed under the imaging tab.  History of abnormal Pap smear: YES - updated in Problem List and Health Maintenance accordingly  PAP / HPV Latest Ref Rng & Units 1/7/2019 5/4/2016 7/30/2015   PAP - NIL NIL NIL   HPV 16 DNA  "NEG:Negative Negative Negative Negative   HPV 18 DNA NEG:Negative Negative Negative Negative   OTHER HR HPV NEG:Negative Positive(A) Negative Positive(A)     Reviewed and updated as needed this visit by clinical staff  Tobacco  Allergies  Meds  Med Hx  Surg Hx  Fam Hx  Soc Hx        Reviewed and updated as needed this visit by Provider            Review of Systems   Constitutional: Negative for chills and fever.   HENT: Negative for congestion and ear pain.    Eyes: Negative for pain.   Respiratory: Negative for cough.    Cardiovascular: Negative for chest pain.   Gastrointestinal: Negative for abdominal pain, constipation, diarrhea and hematochezia.   Genitourinary: Positive for frequency. Negative for hematuria.   Neurological: Negative for dizziness.   Psychiatric/Behavioral: The patient is not nervous/anxious.           OBJECTIVE:   /79   Pulse 75   Temp 98.2  F (36.8  C) (Oral)   Resp 16   Ht 1.613 m (5' 3.5\")   Wt 54.5 kg (120 lb 2 oz)   LMP 02/15/2020 (Approximate)   SpO2 97%   BMI 20.95 kg/m    Physical Exam  GENERAL: healthy, alert and no distress  EYES: Eyes grossly normal to inspection, PERRL and conjunctivae and sclerae normal  HENT: ear canals and TM's normal, nose and mouth without ulcers or lesions  NECK: no adenopathy, no asymmetry, masses, or scars and thyroid normal to palpation  RESP: lungs clear to auscultation - no rales, rhonchi or wheezes  BREAST: normal without masses, tenderness or nipple discharge and no palpable axillary masses or adenopathy  CV: regular rate and rhythm, normal S1 S2, no S3 or S4, no murmur, click or rub, no peripheral edema and peripheral pulses strong  ABDOMEN: soft, nontender, no hepatosplenomegaly, no masses and bowel sounds normal   (female): normal female external genitalia, normal urethral meatus, vaginal mucosa pink, moist, well rugated, and normal cervix/adnexa/uterus without masses or discharge  MS: no gross musculoskeletal defects noted, no " "edema  SKIN: no suspicious lesions or rashes  NEURO: Normal strength and tone, mentation intact and speech normal  PSYCH: mentation appears normal, affect normal/bright        ASSESSMENT/PLAN:   (Z00.00) Routine general medical examination at a health care facility  (primary encounter diagnosis)  Comment:   Plan: Lipid panel reflex to direct LDL Fasting,         Glucose, Pap imaged thin layer screen with HPV         - recommended age 30 - 65 years (select HPV         order below), HPV High Risk Types DNA Cervical            (F41.1) GENERALIZED ANXIETY DIS  Comment: stable  Plan: PARoxetine (PAXIL) 40 MG tablet, QUEtiapine         (SEROQUEL) 50 MG tablet        The current medical regimen is effective;  continue present plan and medications.     (N32.81) Overactive bladder  Comment:   Plan: trospium (SANCTURA) 20 MG tablet            (R87.810) Cervical high risk HPV (human papillomavirus) test positive  Comment:   Plan: Pap imaged thin layer screen with HPV -         recommended age 30 - 65 years (select HPV order        below), HPV High Risk Types DNA Cervical              COUNSELING:  Reviewed preventive health counseling, as reflected in patient instructions    Estimated body mass index is 20.95 kg/m  as calculated from the following:    Height as of this encounter: 1.613 m (5' 3.5\").    Weight as of this encounter: 54.5 kg (120 lb 2 oz).         reports that she has never smoked. She has never used smokeless tobacco.      Counseling Resources:  ATP IV Guidelines  Pooled Cohorts Equation Calculator  Breast Cancer Risk Calculator  FRAX Risk Assessment  ICSI Preventive Guidelines  Dietary Guidelines for Americans, 2010  USDA's MyPlate  ASA Prophylaxis  Lung CA Screening    Susan Booker NP  [facility]  "

## 2020-03-05 LAB
COPATH REPORT: NORMAL
PAP: NORMAL

## 2020-09-09 ENCOUNTER — OFFICE VISIT (OUTPATIENT)
Dept: OBGYN | Facility: CLINIC | Age: 40
End: 2020-09-09
Payer: COMMERCIAL

## 2020-09-09 VITALS
WEIGHT: 119 LBS | DIASTOLIC BLOOD PRESSURE: 78 MMHG | BODY MASS INDEX: 20.75 KG/M2 | HEART RATE: 99 BPM | SYSTOLIC BLOOD PRESSURE: 120 MMHG

## 2020-09-09 DIAGNOSIS — R87.810 CERVICAL HIGH RISK HPV (HUMAN PAPILLOMAVIRUS) TEST POSITIVE: Primary | ICD-10-CM

## 2020-09-09 DIAGNOSIS — Z23 NEED FOR PROPHYLACTIC VACCINATION AND INOCULATION AGAINST INFLUENZA: ICD-10-CM

## 2020-09-09 DIAGNOSIS — R87.810 CERVICAL HIGH RISK HUMAN PAPILLOMAVIRUS (HPV) DNA TEST POSITIVE: ICD-10-CM

## 2020-09-09 LAB — HCG UR QL: NEGATIVE

## 2020-09-09 PROCEDURE — 81025 URINE PREGNANCY TEST: CPT | Performed by: OBSTETRICS & GYNECOLOGY

## 2020-09-09 PROCEDURE — 88305 TISSUE EXAM BY PATHOLOGIST: CPT | Performed by: OBSTETRICS & GYNECOLOGY

## 2020-09-09 PROCEDURE — 90471 IMMUNIZATION ADMIN: CPT | Performed by: OBSTETRICS & GYNECOLOGY

## 2020-09-09 PROCEDURE — 90686 IIV4 VACC NO PRSV 0.5 ML IM: CPT | Performed by: OBSTETRICS & GYNECOLOGY

## 2020-09-09 PROCEDURE — 57454 BX/CURETT OF CERVIX W/SCOPE: CPT | Performed by: OBSTETRICS & GYNECOLOGY

## 2020-09-09 NOTE — PROGRESS NOTES
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  Patient reports no other concerns.    Pap Hx:  2013: ASCUS, Neg HPV  7/30/15: NIL pap, + HPV (not 16 or 18)  16 Dx pap= NIL, Neg HPV  19: NIL pap, + HR HPV (not 16 or 18) result  20: NIL Pap,  + HR HPV (not 16 or 18) result    No LMP recorded. (Menstrual status: Irregular Periods).   UPT today is negative    Patient does not smoke    Type of contraception: none  Age at first sexual intercourse: 26  Number of sexual partners (lifetime): more than 6  Past GYN history:  No STD history and HPV  Patient reports that she got gardasil vaccine when it was first released (likely quadrivalent)    OBGYN Hx:     Menses:irregular  Sexually active intermittently, last a few months ago   STD Hx:none, declines testing today  PMH: anxiety, depression  PSH:lasik  Meds:paxil, seroquel, trospium  Allergies:NKDA  SH: Denies any tobacco or drug use.  Patient reports that she consumes 1 drink per week      O:   Patient Vitals for the past 24 hrs:   BP Pulse Weight   20 1128 120/78 99 54 kg (119 lb)   ]    PROCEDURE:  Before the procedure, it was ensured that the patient was educated regarding the nature of her findings to date, the implications, and what was to be done. She has been made aware of the role of HPV, the natural history of infection, ways to minimize her future risk, the effect of HPV on the cervix, and treatment options available should they be indicated.  The details of the colposcopic procedure were reviewed. Indications, alternatives, benefits, and risks including bleeding, infection, pain, and injury to surrounding organs were reviewed.  Questions and concerns were addressed.  Consent was signed.     Medium sofia speculum placed in vagina and excellent visualization of cervix  achieved, cervix swabbed x 3 with acetic acid solution.  Cervix noted to have acetowhite changes with mosacism at 4 oclock and acetwhite change at 8 oclock.  Lugol's applied showing decreased uptake at 4 and 8 oclock.  Cervical biopsy taken at 4 and 8 oclock.  Endocervical currettage collected.  Cervix made hemostatic with silver nitrate sticks.  Speculum removed. Patient tolerated procedure well.    FINDINGS:  Cervix: acetowhite changes with mosacism at 4 oclock and acetwhite change at 8 oclock.  Lugol's applied showing decreased uptake at 4 and 8 oclock.    Pap repeated?:  No  SCJ seen?:  yes    ECC done?:  Yes   Lugol's solution used?:  Yes   Satisfactory examination?:  yes      ASSESSMENT: HPV related changes.    PLAN: specimens labelled and sent to Pathology, will base further treatment on Pathology findings, treatment options discussed with patient and post biopsy instructions given to patient    Mireille Walters MD

## 2020-09-09 NOTE — PATIENT INSTRUCTIONS
Patient Education     Colposcopy  Colposcopy is a procedure that gives your healthcare provider a magnified view of your cervix. It is done using a lighted microscope called a colposcope. In most cases, a sample of cervical cells is taken during a biopsy. The sample can then be studied in a lab. If any problems are found, you and your healthcare provider will discuss treatment options. It usually takes less than 30 minutes, and you can often go back to your normal routine right away.  Reasons for the procedure  Colposcopy is usually done as a follow-up exam to help find the cause of an abnormal Pap test. Results of an abnormal Pap test can mean that the cells don t appear normal or that there are cancer cells. Abnormal cells can also be caused by infections. HPV (human papillomavirus) is a large family of viruses that can be passed from person to person through sex. HPV can cause genital warts. It can also cause changes in cervical cells. If an HPV test is positive and the Pap test is abnormal, a colposcopy may be recommended. Colposcopy is also used to evaluate other problems. These include pain or bleeding during sex, or a sore (lesion) on the vulva or vagina.  What are the risks?  Problems after colposcopy are very rare, but can include:    Bleeding (if a biopsy is done)    Infection  Getting ready for the procedure  Colposcopy is normally done in your healthcare provider s office. It will be scheduled for a time when you re not having your menstrual period. You may be asked to sign a form giving your consent to have the procedure. A day or 2 before the procedure, your healthcare provider may also ask you to:    Not have sex    Stop using tampons    Not use creams or other vaginal medicines    Not clean out the vagina with water or other fluids (douche)    Take over-the-counter pain medicines an hour or 2 before the procedure  During colposcopy    You will be asked to lie on an exam table with your knees bent,  just as you do for a Pap test.    A tool called a speculum is inserted into the vagina to hold it open.    A vinegar solution is applied to the cervix to make the abnormal cells easier to see. You may feel pressure or a slight burning for a few moments. In some cases, the cervix may be numbed first with an anesthetic.    The cervix is looked at through the colposcope, which is placed outside the vagina.    If your healthcare provider sees abnormal areas on your cervix, a biopsy will be done. The tissue sample is sent to a lab for study.    An endocervical curettage may also be done at the time of colposcopy. In this procedure, a tool is put into the endocervical canal to get a sample of cells from the endocervix. This area can't be seen with a colposcope.     You may feel slight pinching or cramping during the biopsy. Medicine may be applied to the biopsy site to stop bleeding.    After the procedure    If you feel lightheaded or dizzy, you can rest on the table until you re ready to get dressed.    If a biopsy were done, you may have mild cramping or light bleeding for a few days. You may also have discharge from the medicine used to stop bleeding at the biopsy site.    Use pads, not tampons, for at least the first 24 hours.    If you have any discomfort, over-the-counter pain medicine can provide relief.    Ask your healthcare provider when you can have sex again.    Follow-up  If a biopsy were done, your healthcare provider will get the lab report in a week or 2. You and your healthcare provider can then discuss the results. In some cases, you may be scheduled for more tests or treatment. Be sure to keep follow-up appointments with your healthcare provider.  Call your healthcare provider if you have:    Heavy vaginal bleeding (more than a pad an hour for 2 hours)    Severe or increasing pelvic pain    A fever over 100.4 F (38 C), or as directed by your provider    Bad-smelling or unusual vaginal discharge  Date  Last Reviewed: 6/1/2017 2000-2019 [employer]. [address] [place] [id number]. All rights reserved. This information is not intended as a substitute for professional medical care. Always follow your healthcare professional's instructions.           Patient Education     Understanding Cervical Biopsy    A cervical biopsy removes a small piece of tissue from your cervix. This tissue is sent to a lab to check for any problems with your cervix, such as precancerous conditions or cancer. The cervix is the narrowest part of your womb (uterus). It links the uterus to the vagina.  Why cervical biopsy is done  This procedure may be done if you have an abnormal Pap test or pelvic exam. It helps find cancer.  How cervical biopsy is done  This procedure is often done on an outpatient basis. That means you can go home afterward. During the procedure:    Your healthcare provider puts a speculum into your vagina. It helps him or her see your cervix better.    He or she uses a colposcope to look at your cervix. This tool magnifies the cervix.    He or she dabs some acetic acid onto your cervix. This solution helps to find any areas of concern.    You may be given medicine so you don t feel pain. The medicine is injected into your cervix.    Your healthcare provider places forceps on the abnormal area of your cervix.    He or she closes shut the jaws of the forceps. This cuts off a piece of tissue.    He or she may put a brown paste on the area to stop any bleeding. You may need a stitch if the bleeding doesn t stop.    The tissue is sent to a lab to check for cancer.  Risks of cervical biopsy  These include:    Bleeding    Infection  Date Last Reviewed: 6/1/2016 2000-2019 [employer]. [address] [place] [id number]. All rights reserved. This information is not intended as a substitute for professional medical care. Always follow your healthcare professional's  instructions.

## 2020-09-09 NOTE — NURSING NOTE
"Chief Complaint   Patient presents with     Colposcopy       Initial /78   Pulse 99   Wt 54 kg (119 lb)   BMI 20.75 kg/m   Estimated body mass index is 20.75 kg/m  as calculated from the following:    Height as of 3/3/20: 1.613 m (5' 3.5\").    Weight as of this encounter: 54 kg (119 lb).  BP completed using cuff size: regular    Questioned patient about current smoking habits.  Pt. has never smoked.      No obstetric history on file.    The following HM Due: NONE      The following patient reported/Care Every where data was sent to:  P ABSTRACT QUALITY INITIATIVES [[id number]]        N/a      Galina Dukes MA                "

## 2020-09-12 LAB — COPATH REPORT: NORMAL

## 2020-09-14 ENCOUNTER — PATIENT OUTREACH (OUTPATIENT)
Dept: OBGYN | Facility: CLINIC | Age: 40
End: 2020-09-14

## 2020-09-14 NOTE — TELEPHONE ENCOUNTER
2013: ASCUS, Neg HPV. Plan cotest pap & HPV in 3 years.  7/30/15: NIL pap, + HPV (not 16 or 18). Plan cotest pap & HPV in 1 year.   05/04/16 Dx pap= NIL, Neg HPV. Repeat co-testing 3 yrs  01/07/19: NIL pap, + HR HPV (not 16 or 18) result. Plan cotest in 1 year per provider.   03/03/20: NIL Pap,  + HR HPV (not 16 or 18) result. Plan [place]. CCT  09/09/20: [place] Bx and ECC a minute amount of atypical squamous epithelium. Plan cotest in 1 year due 09/09/21.

## 2020-11-12 ENCOUNTER — OFFICE VISIT (OUTPATIENT)
Dept: FAMILY MEDICINE | Facility: CLINIC | Age: 40
End: 2020-11-12
Payer: COMMERCIAL

## 2020-11-12 VITALS
SYSTOLIC BLOOD PRESSURE: 105 MMHG | WEIGHT: 120 LBS | OXYGEN SATURATION: 98 % | DIASTOLIC BLOOD PRESSURE: 77 MMHG | BODY MASS INDEX: 20.92 KG/M2 | TEMPERATURE: 98 F | HEART RATE: 101 BPM

## 2020-11-12 DIAGNOSIS — N32.81 OVERACTIVE BLADDER: ICD-10-CM

## 2020-11-12 DIAGNOSIS — G44.52 NEW DAILY PERSISTENT HEADACHE: Primary | ICD-10-CM

## 2020-11-12 DIAGNOSIS — F41.1 GENERALIZED ANXIETY DISORDER: ICD-10-CM

## 2020-11-12 DIAGNOSIS — R53.83 FATIGUE, UNSPECIFIED TYPE: ICD-10-CM

## 2020-11-12 LAB
ALBUMIN SERPL-MCNC: 4 G/DL (ref 3.4–5)
ALP SERPL-CCNC: 57 U/L (ref 40–150)
ALT SERPL W P-5'-P-CCNC: 20 U/L (ref 0–50)
ANION GAP SERPL CALCULATED.3IONS-SCNC: 7 MMOL/L (ref 3–14)
AST SERPL W P-5'-P-CCNC: 17 U/L (ref 0–45)
BILIRUB SERPL-MCNC: 0.5 MG/DL (ref 0.2–1.3)
BUN SERPL-MCNC: 9 MG/DL (ref 7–30)
CALCIUM SERPL-MCNC: 9.3 MG/DL (ref 8.5–10.1)
CHLORIDE SERPL-SCNC: 107 MMOL/L (ref 94–109)
CO2 SERPL-SCNC: 25 MMOL/L (ref 20–32)
CREAT SERPL-MCNC: 0.7 MG/DL (ref 0.52–1.04)
DEPRECATED CALCIDIOL+CALCIFEROL SERPL-MC: 21 UG/L (ref 20–75)
ERYTHROCYTE [DISTWIDTH] IN BLOOD BY AUTOMATED COUNT: 13.3 % (ref 10–15)
FERRITIN SERPL-MCNC: 9 NG/ML (ref 12–150)
GFR SERPL CREATININE-BSD FRML MDRD: >90 ML/MIN/{1.73_M2}
GLUCOSE SERPL-MCNC: 96 MG/DL (ref 70–99)
HCT VFR BLD AUTO: 41.6 % (ref 35–47)
HGB BLD-MCNC: 13.8 G/DL (ref 11.7–15.7)
IRON SATN MFR SERPL: 18 % (ref 15–46)
IRON SERPL-MCNC: 68 UG/DL (ref 35–180)
MCH RBC QN AUTO: 30 PG (ref 26.5–33)
MCHC RBC AUTO-ENTMCNC: 33.2 G/DL (ref 31.5–36.5)
MCV RBC AUTO: 90 FL (ref 78–100)
PLATELET # BLD AUTO: 254 10E9/L (ref 150–450)
POTASSIUM SERPL-SCNC: 3.9 MMOL/L (ref 3.4–5.3)
PROT SERPL-MCNC: 7.6 G/DL (ref 6.8–8.8)
RBC # BLD AUTO: 4.6 10E12/L (ref 3.8–5.2)
SODIUM SERPL-SCNC: 139 MMOL/L (ref 133–144)
TIBC SERPL-MCNC: 383 UG/DL (ref 240–430)
TSH SERPL DL<=0.005 MIU/L-ACNC: 1.41 MU/L (ref 0.4–4)
VIT B12 SERPL-MCNC: 572 PG/ML (ref 193–986)
WBC # BLD AUTO: 7.1 10E9/L (ref 4–11)

## 2020-11-12 PROCEDURE — 82607 VITAMIN B-12: CPT | Performed by: PHYSICIAN ASSISTANT

## 2020-11-12 PROCEDURE — 83540 ASSAY OF IRON: CPT | Performed by: PHYSICIAN ASSISTANT

## 2020-11-12 PROCEDURE — 85027 COMPLETE CBC AUTOMATED: CPT | Performed by: PHYSICIAN ASSISTANT

## 2020-11-12 PROCEDURE — 36415 COLL VENOUS BLD VENIPUNCTURE: CPT | Performed by: PHYSICIAN ASSISTANT

## 2020-11-12 PROCEDURE — 82306 VITAMIN D 25 HYDROXY: CPT | Performed by: PHYSICIAN ASSISTANT

## 2020-11-12 PROCEDURE — 80053 COMPREHEN METABOLIC PANEL: CPT | Performed by: PHYSICIAN ASSISTANT

## 2020-11-12 PROCEDURE — 83550 IRON BINDING TEST: CPT | Performed by: PHYSICIAN ASSISTANT

## 2020-11-12 PROCEDURE — 99214 OFFICE O/P EST MOD 30 MIN: CPT | Performed by: PHYSICIAN ASSISTANT

## 2020-11-12 PROCEDURE — 84443 ASSAY THYROID STIM HORMONE: CPT | Performed by: PHYSICIAN ASSISTANT

## 2020-11-12 PROCEDURE — 82728 ASSAY OF FERRITIN: CPT | Performed by: PHYSICIAN ASSISTANT
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
## 2020-11-19 ENCOUNTER — ANCILLARY PROCEDURE (OUTPATIENT)
Dept: MRI IMAGING | Facility: CLINIC | Age: 40
End: 2020-11-19
Attending: PHYSICIAN ASSISTANT
Payer: COMMERCIAL

## 2020-11-19 DIAGNOSIS — G44.52 NEW DAILY PERSISTENT HEADACHE: ICD-10-CM

## 2020-11-19 PROCEDURE — 70551 MRI BRAIN STEM W/O DYE: CPT | Mod: GC | Performed by: RADIOLOGY
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
## 2020-11-25 NOTE — PATIENT INSTRUCTIONS
AFTER VISIT SUMMARY (AVS):    At today's visit we thoroughly discussed various diagnostic possibilities for your symptoms that are most likely consistent with tension headaches.  We also reviewed brain MRI results (normal), available treatment options, and the plan.    For headache prevention:  1.  Riboflavin 400 mg every morning for the next 3 months.  Please contact my clinic with any intolerable side effects and give me an update in 4-6 weeks after starting this supplement.  2. Other future options include Effexor (would require to stop paroxetine), Nortriptyline, Amitriptyline (these 2 options would require to stop Seroquel and Sanctura), gabapentin, and Topamax.  For acute headache therapy: May use 500-1000 mg of Tylenol or Naproxen 500 mg at onset of intolerable headache, but should take latter with food and limit use of all analgesics to less that 15 days/month.    Reviewed non-pharmacological headache prevention measures include proper sleep hygiene, regular meals, adequate hydration, regular aerobic exercise, and stress reduction techniques.    Please keep the headache diary and bring it to the next follow up visit or upload it via My Chart.    Next follow-up appointment is in the next 3 months or earlier if needed.    Please do not hesitate to call me with any questions or concerns.    Thanks.

## 2020-11-27 ENCOUNTER — OFFICE VISIT (OUTPATIENT)
Dept: NEUROLOGY | Facility: CLINIC | Age: 40
End: 2020-11-27
Payer: COMMERCIAL

## 2020-11-27 VITALS
BODY MASS INDEX: 21.31 KG/M2 | SYSTOLIC BLOOD PRESSURE: 107 MMHG | DIASTOLIC BLOOD PRESSURE: 74 MMHG | TEMPERATURE: 98 F | OXYGEN SATURATION: 96 % | HEART RATE: 98 BPM | WEIGHT: 122.2 LBS

## 2020-11-27 DIAGNOSIS — R51.9 HEADACHE DISORDER: Primary | ICD-10-CM

## 2020-11-27 PROCEDURE — 99205 OFFICE O/P NEW HI 60 MIN: CPT | Performed by: PSYCHIATRY & NEUROLOGY
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
## 2021-01-01 ENCOUNTER — TELEPHONE (OUTPATIENT)
Dept: FAMILY MEDICINE | Facility: CLINIC | Age: 41
End: 2021-01-01

## 2021-01-01 ENCOUNTER — HEALTH MAINTENANCE LETTER (OUTPATIENT)
Age: 41
End: 2021-01-01

## 2021-01-01 ENCOUNTER — ALLIED HEALTH/NURSE VISIT (OUTPATIENT)
Dept: UROLOGY | Facility: CLINIC | Age: 41
End: 2021-01-01
Payer: COMMERCIAL

## 2021-01-01 ENCOUNTER — HOSPITAL ENCOUNTER (INPATIENT)
Facility: CLINIC | Age: 41
LOS: 4 days | Discharge: HOME | DRG: 885 | End: 2021-09-13
Attending: EMERGENCY MEDICINE | Admitting: PSYCHIATRY & NEUROLOGY
Payer: COMMERCIAL

## 2021-01-01 ENCOUNTER — TELEPHONE (OUTPATIENT)
Dept: FAMILY MEDICINE | Facility: CLINIC | Age: 41
End: 2021-01-01
Payer: COMMERCIAL

## 2021-01-01 ENCOUNTER — PRE VISIT (OUTPATIENT)
Dept: UROLOGY | Facility: CLINIC | Age: 41
End: 2021-01-01

## 2021-01-01 ENCOUNTER — VIRTUAL VISIT (OUTPATIENT)
Dept: UROLOGY | Facility: CLINIC | Age: 41
End: 2021-01-01
Attending: NURSE PRACTITIONER
Payer: COMMERCIAL

## 2021-01-01 ENCOUNTER — PATIENT OUTREACH (OUTPATIENT)
Dept: CARE COORDINATION | Facility: CLINIC | Age: 41
End: 2021-01-01
Payer: COMMERCIAL

## 2021-01-01 ENCOUNTER — PATIENT OUTREACH (OUTPATIENT)
Dept: FAMILY MEDICINE | Facility: CLINIC | Age: 41
End: 2021-01-01

## 2021-01-01 ENCOUNTER — PATIENT OUTREACH (OUTPATIENT)
Dept: CARE COORDINATION | Facility: CLINIC | Age: 41
End: 2021-01-01

## 2021-01-01 ENCOUNTER — APPOINTMENT (OUTPATIENT)
Dept: NURSING | Facility: CLINIC | Age: 41
End: 2021-01-01
Payer: COMMERCIAL

## 2021-01-01 ENCOUNTER — OFFICE VISIT (OUTPATIENT)
Dept: FAMILY MEDICINE | Facility: CLINIC | Age: 41
End: 2021-01-01
Payer: COMMERCIAL

## 2021-01-01 ENCOUNTER — ANCILLARY PROCEDURE (OUTPATIENT)
Dept: RADIOLOGY | Facility: AMBULATORY SURGERY CENTER | Age: 41
End: 2021-01-01
Attending: PHYSICIAN ASSISTANT
Payer: COMMERCIAL

## 2021-01-01 ENCOUNTER — THERAPY VISIT (OUTPATIENT)
Dept: PHYSICAL THERAPY | Facility: CLINIC | Age: 41
End: 2021-01-01
Attending: PHYSICIAN ASSISTANT
Payer: COMMERCIAL

## 2021-01-01 ENCOUNTER — PATIENT OUTREACH (OUTPATIENT)
Dept: NURSING | Facility: CLINIC | Age: 41
End: 2021-01-01
Payer: COMMERCIAL

## 2021-01-01 ENCOUNTER — TELEPHONE (OUTPATIENT)
Dept: BEHAVIORAL HEALTH | Facility: CLINIC | Age: 41
End: 2021-01-01

## 2021-01-01 ENCOUNTER — THERAPY VISIT (OUTPATIENT)
Dept: PHYSICAL THERAPY | Facility: CLINIC | Age: 41
End: 2021-01-01
Payer: COMMERCIAL

## 2021-01-01 ENCOUNTER — DOCUMENTATION ONLY (OUTPATIENT)
Dept: BEHAVIORAL HEALTH | Facility: CLINIC | Age: 41
End: 2021-01-01

## 2021-01-01 ENCOUNTER — VIRTUAL VISIT (OUTPATIENT)
Dept: UROLOGY | Facility: CLINIC | Age: 41
End: 2021-01-01
Payer: COMMERCIAL

## 2021-01-01 ENCOUNTER — TELEPHONE (OUTPATIENT)
Dept: UROLOGY | Facility: CLINIC | Age: 41
End: 2021-01-01

## 2021-01-01 VITALS
HEIGHT: 63 IN | DIASTOLIC BLOOD PRESSURE: 80 MMHG | BODY MASS INDEX: 22.52 KG/M2 | WEIGHT: 127.12 LBS | SYSTOLIC BLOOD PRESSURE: 108 MMHG | RESPIRATION RATE: 14 BRPM | HEART RATE: 61 BPM | OXYGEN SATURATION: 97 % | TEMPERATURE: 98.6 F

## 2021-01-01 VITALS
BODY MASS INDEX: 21.79 KG/M2 | SYSTOLIC BLOOD PRESSURE: 118 MMHG | OXYGEN SATURATION: 97 % | HEART RATE: 86 BPM | DIASTOLIC BLOOD PRESSURE: 88 MMHG | WEIGHT: 123 LBS | TEMPERATURE: 97.4 F | RESPIRATION RATE: 16 BRPM

## 2021-01-01 VITALS
BODY MASS INDEX: 21.44 KG/M2 | WEIGHT: 121 LBS | TEMPERATURE: 97.4 F | OXYGEN SATURATION: 100 % | HEIGHT: 63 IN | HEART RATE: 67 BPM | SYSTOLIC BLOOD PRESSURE: 120 MMHG | RESPIRATION RATE: 15 BRPM | DIASTOLIC BLOOD PRESSURE: 84 MMHG

## 2021-01-01 VITALS
WEIGHT: 125 LBS | BODY MASS INDEX: 22.15 KG/M2 | HEIGHT: 63 IN | DIASTOLIC BLOOD PRESSURE: 81 MMHG | HEART RATE: 123 BPM | SYSTOLIC BLOOD PRESSURE: 121 MMHG

## 2021-01-01 DIAGNOSIS — R87.810 CERVICAL HIGH RISK HPV (HUMAN PAPILLOMAVIRUS) TEST POSITIVE: ICD-10-CM

## 2021-01-01 DIAGNOSIS — J35.8 TONSILLOLITH: ICD-10-CM

## 2021-01-01 DIAGNOSIS — N32.81 OVERACTIVE BLADDER: ICD-10-CM

## 2021-01-01 DIAGNOSIS — R52 PAIN: ICD-10-CM

## 2021-01-01 DIAGNOSIS — N32.81 OVERACTIVE BLADDER: Primary | ICD-10-CM

## 2021-01-01 DIAGNOSIS — R39.15 URINARY URGENCY: ICD-10-CM

## 2021-01-01 DIAGNOSIS — Z11.52 ENCOUNTER FOR SCREENING LABORATORY TESTING FOR SEVERE ACUTE RESPIRATORY SYNDROME CORONAVIRUS 2 (SARS-COV-2): ICD-10-CM

## 2021-01-01 DIAGNOSIS — R76.8 ELEVATED ANTINUCLEAR ANTIBODY (ANA) LEVEL: ICD-10-CM

## 2021-01-01 DIAGNOSIS — R35.0 URINARY FREQUENCY: ICD-10-CM

## 2021-01-01 DIAGNOSIS — Z76.89 ENCOUNTER FOR SUPPORT AND COORDINATION OF TRANSITION OF CARE: ICD-10-CM

## 2021-01-01 DIAGNOSIS — F31.9 BIPOLAR 1 DISORDER (H): Primary | ICD-10-CM

## 2021-01-01 DIAGNOSIS — F41.1 GENERALIZED ANXIETY DISORDER: ICD-10-CM

## 2021-01-01 DIAGNOSIS — M99.05 PELVIC SOMATIC DYSFUNCTION: ICD-10-CM

## 2021-01-01 DIAGNOSIS — F29 PSYCHOSIS, UNSPECIFIED PSYCHOSIS TYPE (H): ICD-10-CM

## 2021-01-01 DIAGNOSIS — E61.1 IRON DEFICIENCY: ICD-10-CM

## 2021-01-01 DIAGNOSIS — Z00.00 ROUTINE GENERAL MEDICAL EXAMINATION AT A HEALTH CARE FACILITY: Primary | ICD-10-CM

## 2021-01-01 DIAGNOSIS — F31.9 BIPOLAR 1 DISORDER (H): ICD-10-CM

## 2021-01-01 DIAGNOSIS — F51.05 INSOMNIA DUE TO OTHER MENTAL DISORDER: Primary | ICD-10-CM

## 2021-01-01 DIAGNOSIS — N39.46 MIXED INCONTINENCE: ICD-10-CM

## 2021-01-01 DIAGNOSIS — F99 INSOMNIA DUE TO OTHER MENTAL DISORDER: Primary | ICD-10-CM

## 2021-01-01 LAB
ALBUMIN SERPL-MCNC: 4.1 G/DL (ref 3.4–5)
ALBUMIN UR-MCNC: NEGATIVE MG/DL
ALBUMIN UR-MCNC: NEGATIVE MG/DL
ALP SERPL-CCNC: 60 U/L (ref 40–150)
ALT SERPL W P-5'-P-CCNC: 25 U/L (ref 0–50)
AMPHETAMINES UR QL SCN: NORMAL
ANA PAT SER IF-IMP: ABNORMAL
ANA SER QL IF: NEGATIVE
ANA SER QL IF: POSITIVE
ANA TITR SER IF: ABNORMAL {TITER}
ANION GAP SERPL CALCULATED.3IONS-SCNC: 6 MMOL/L (ref 3–14)
APPEARANCE UR: CLEAR
APPEARANCE UR: CLEAR
AST SERPL W P-5'-P-CCNC: 35 U/L (ref 0–45)
ATRIAL RATE - MUSE: 91 BPM
ATRIAL RATE - MUSE: 95 BPM
B BURGDOR IGG+IGM SER QL: 0.09
BACTERIA UR CULT: NO GROWTH
BARBITURATES UR QL: NORMAL
BASOPHILS # BLD AUTO: 0 10E3/UL (ref 0–0.2)
BASOPHILS NFR BLD AUTO: 1 %
BENZODIAZ UR QL: NORMAL
BILIRUB SERPL-MCNC: 1.2 MG/DL (ref 0.2–1.3)
BILIRUB UR QL STRIP: NEGATIVE
BILIRUB UR QL STRIP: NEGATIVE
BKR LAB AP GYN ADEQUACY: NORMAL
BKR LAB AP GYN INTERPRETATION: NORMAL
BKR LAB AP HPV REFLEX: NORMAL
BKR LAB AP PREVIOUS ABNL DX: NORMAL
BKR LAB AP PREVIOUS ABNORMAL: NORMAL
BUN SERPL-MCNC: 6 MG/DL (ref 7–30)
CALCIUM SERPL-MCNC: 8.8 MG/DL (ref 8.5–10.1)
CANNABINOIDS UR QL SCN: NORMAL
CARDIOLIPIN IGG SER IA-ACNC: <2 GPL-U/ML
CARDIOLIPIN IGG SER IA-ACNC: NEGATIVE
CARDIOLIPIN IGM SER IA-ACNC: <2 MPL-U/ML
CARDIOLIPIN IGM SER IA-ACNC: NEGATIVE
CERULOPLASMIN SERPL-MCNC: 21 MG/DL (ref 20–60)
CHLORIDE BLD-SCNC: 107 MMOL/L (ref 94–109)
CHOLEST SERPL-MCNC: 202 MG/DL
CO2 SERPL-SCNC: 27 MMOL/L (ref 20–32)
COCAINE UR QL: NORMAL
COLOR UR AUTO: YELLOW
COLOR UR AUTO: YELLOW
CREAT SERPL-MCNC: 0.74 MG/DL (ref 0.52–1.04)
CREAT UR-MCNC: 36 MG/DL
DIASTOLIC BLOOD PRESSURE - MUSE: NORMAL MMHG
DIASTOLIC BLOOD PRESSURE - MUSE: NORMAL MMHG
DRVVT SCREEN RATIO: 0.89
DSDNA AB SER-ACNC: 2 IU/ML
ENA SM IGG SER IA-ACNC: 1.6 U/ML
ENA SM IGG SER IA-ACNC: NEGATIVE
EOSINOPHIL # BLD AUTO: 0.1 10E3/UL (ref 0–0.7)
EOSINOPHIL NFR BLD AUTO: 1 %
ERYTHROCYTE [DISTWIDTH] IN BLOOD BY AUTOMATED COUNT: 12.6 % (ref 10–15)
ERYTHROCYTE [SEDIMENTATION RATE] IN BLOOD BY WESTERGREN METHOD: 10 MM/HR (ref 0–20)
FERRITIN SERPL-MCNC: 23 NG/ML (ref 12–150)
GFR SERPL CREATININE-BSD FRML MDRD: >90 ML/MIN/1.73M2
GLUCOSE BLD-MCNC: 89 MG/DL (ref 70–99)
GLUCOSE UR STRIP-MCNC: NEGATIVE MG/DL
GLUCOSE UR STRIP-MCNC: NEGATIVE MG/DL
HCG UR QL: NEGATIVE
HCT VFR BLD AUTO: 37.8 % (ref 35–47)
HDLC SERPL-MCNC: 77 MG/DL
HGB BLD-MCNC: 12.6 G/DL (ref 11.7–15.7)
HGB UR QL STRIP: ABNORMAL
HGB UR QL STRIP: NEGATIVE
HIV 1+2 AB+HIV1 P24 AG SERPL QL IA: NONREACTIVE
HOLD SPECIMEN: NORMAL
HUMAN PAPILLOMA VIRUS 16 DNA: NEGATIVE
HUMAN PAPILLOMA VIRUS 18 DNA: NEGATIVE
HUMAN PAPILLOMA VIRUS FINAL DIAGNOSIS: ABNORMAL
HUMAN PAPILLOMA VIRUS OTHER HR: POSITIVE
IMM GRANULOCYTES # BLD: 0 10E3/UL
IMM GRANULOCYTES NFR BLD: 0 %
INR PPP: 0.88 (ref 0.85–1.15)
INTERPRETATION ECG - MUSE: NORMAL
INTERPRETATION ECG - MUSE: NORMAL
KETONES UR STRIP-MCNC: NEGATIVE MG/DL
KETONES UR STRIP-MCNC: NEGATIVE MG/DL
LA PPP-IMP: NEGATIVE
LDLC SERPL CALC-MCNC: 106 MG/DL
LEUKOCYTE ESTERASE UR QL STRIP: NEGATIVE
LEUKOCYTE ESTERASE UR QL STRIP: NEGATIVE
LUPUS INTERPRETATION: NORMAL
LYMPHOCYTES # BLD AUTO: 1.9 10E3/UL (ref 0.8–5.3)
LYMPHOCYTES NFR BLD AUTO: 23 %
MCH RBC QN AUTO: 29.8 PG (ref 26.5–33)
MCHC RBC AUTO-ENTMCNC: 33.3 G/DL (ref 31.5–36.5)
MCV RBC AUTO: 89 FL (ref 78–100)
MICROALBUMIN UR-MCNC: <5 MG/L
MICROALBUMIN/CREAT UR: NORMAL MG/G{CREAT}
MONOCYTES # BLD AUTO: 0.9 10E3/UL (ref 0–1.3)
MONOCYTES NFR BLD AUTO: 10 %
MUCOUS THREADS #/AREA URNS LPF: PRESENT /LPF
NEUTROPHILS # BLD AUTO: 5.5 10E3/UL (ref 1.6–8.3)
NEUTROPHILS NFR BLD AUTO: 65 %
NITRATE UR QL: NEGATIVE
NITRATE UR QL: NEGATIVE
NONHDLC SERPL-MCNC: 125 MG/DL
NRBC # BLD AUTO: 0 10E3/UL
NRBC BLD AUTO-RTO: 0 /100
OPIATES UR QL SCN: NORMAL
P AXIS - MUSE: 71 DEGREES
P AXIS - MUSE: 71 DEGREES
PATH REPORT.COMMENTS IMP SPEC: NORMAL
PATH REPORT.RELEVANT HX SPEC: NORMAL
PH UR STRIP: 6 [PH] (ref 5–7)
PH UR STRIP: 6.5 [PH] (ref 5–8)
PLATELET # BLD AUTO: 254 10E3/UL (ref 150–450)
POTASSIUM BLD-SCNC: 3.4 MMOL/L (ref 3.4–5.3)
PR INTERVAL - MUSE: 126 MS
PR INTERVAL - MUSE: 150 MS
PROT SERPL-MCNC: 7.5 G/DL (ref 6.8–8.8)
PTT RATIO: 0.82
QRS DURATION - MUSE: 74 MS
QRS DURATION - MUSE: 78 MS
QT - MUSE: 376 MS
QT - MUSE: 394 MS
QTC - MUSE: 462 MS
QTC - MUSE: 495 MS
R AXIS - MUSE: 113 DEGREES
R AXIS - MUSE: 96 DEGREES
RBC # BLD AUTO: 4.23 10E6/UL (ref 3.8–5.2)
RBC URINE: 1 /HPF
SARS-COV-2 RNA RESP QL NAA+PROBE: NEGATIVE
SODIUM SERPL-SCNC: 140 MMOL/L (ref 133–144)
SP GR UR STRIP: 1.01 (ref 1–1.03)
SP GR UR STRIP: 1.02 (ref 1–1.03)
SQUAMOUS EPITHELIAL: <1 /HPF
SYSTOLIC BLOOD PRESSURE - MUSE: NORMAL MMHG
SYSTOLIC BLOOD PRESSURE - MUSE: NORMAL MMHG
T AXIS - MUSE: 63 DEGREES
T AXIS - MUSE: 64 DEGREES
T PALLIDUM AB SER QL: NONREACTIVE
THROMBIN TIME: 15.3 SECONDS (ref 13–19)
TRIGL SERPL-MCNC: 94 MG/DL
TSH SERPL DL<=0.005 MIU/L-ACNC: 2.09 MU/L (ref 0.4–4)
U1 SNRNP IGG SER IA-ACNC: <1.1 U/ML
U1 SNRNP IGG SER IA-ACNC: NEGATIVE
UROBILINOGEN UR STRIP-ACNC: 0.2 E.U./DL
UROBILINOGEN UR STRIP-MCNC: NORMAL MG/DL
VENTRICULAR RATE- MUSE: 91 BPM
VENTRICULAR RATE- MUSE: 95 BPM
WBC # BLD AUTO: 8.4 10E3/UL (ref 4–11)
WBC URINE: <1 /HPF

## 2021-01-01 PROCEDURE — 250N000013 HC RX MED GY IP 250 OP 250 PS 637: Performed by: PSYCHIATRY & NEUROLOGY

## 2021-01-01 PROCEDURE — 93005 ELECTROCARDIOGRAM TRACING: CPT | Performed by: EMERGENCY MEDICINE

## 2021-01-01 PROCEDURE — 86235 NUCLEAR ANTIGEN ANTIBODY: CPT | Performed by: NURSE PRACTITIONER

## 2021-01-01 PROCEDURE — 99215 OFFICE O/P EST HI 40 MIN: CPT | Performed by: NURSE PRACTITIONER

## 2021-01-01 PROCEDURE — 86147 CARDIOLIPIN ANTIBODY EA IG: CPT | Mod: 59 | Performed by: NURSE PRACTITIONER

## 2021-01-01 PROCEDURE — 258N000003 HC RX IP 258 OP 636: Performed by: EMERGENCY MEDICINE

## 2021-01-01 PROCEDURE — 250N000011 HC RX IP 250 OP 636: Performed by: EMERGENCY MEDICINE

## 2021-01-01 PROCEDURE — 97161 PT EVAL LOW COMPLEX 20 MIN: CPT | Mod: GP | Performed by: PHYSICAL THERAPIST

## 2021-01-01 PROCEDURE — 82728 ASSAY OF FERRITIN: CPT | Performed by: NURSE PRACTITIONER

## 2021-01-01 PROCEDURE — 97110 THERAPEUTIC EXERCISES: CPT | Mod: GP | Performed by: PHYSICAL THERAPIST

## 2021-01-01 PROCEDURE — 82390 ASSAY OF CERULOPLASMIN: CPT | Performed by: STUDENT IN AN ORGANIZED HEALTH CARE EDUCATION/TRAINING PROGRAM

## 2021-01-01 PROCEDURE — 90791 PSYCH DIAGNOSTIC EVALUATION: CPT

## 2021-01-01 PROCEDURE — 51728 CYSTOMETROGRAM W/VP: CPT | Performed by: PHYSICIAN ASSISTANT

## 2021-01-01 PROCEDURE — 81003 URINALYSIS AUTO W/O SCOPE: CPT | Performed by: PATHOLOGY

## 2021-01-01 PROCEDURE — 99203 OFFICE O/P NEW LOW 30 MIN: CPT | Mod: 95 | Performed by: PHYSICIAN ASSISTANT

## 2021-01-01 PROCEDURE — 96372 THER/PROPH/DIAG INJ SC/IM: CPT | Performed by: EMERGENCY MEDICINE

## 2021-01-01 PROCEDURE — 51797 INTRAABDOMINAL PRESSURE TEST: CPT | Performed by: PHYSICIAN ASSISTANT

## 2021-01-01 PROCEDURE — 80307 DRUG TEST PRSMV CHEM ANLYZR: CPT | Performed by: EMERGENCY MEDICINE

## 2021-01-01 PROCEDURE — 74455 X-RAY URETHRA/BLADDER: CPT | Performed by: PHYSICIAN ASSISTANT

## 2021-01-01 PROCEDURE — 86225 DNA ANTIBODY NATIVE: CPT | Performed by: NURSE PRACTITIONER

## 2021-01-01 PROCEDURE — C9803 HOPD COVID-19 SPEC COLLECT: HCPCS | Performed by: EMERGENCY MEDICINE

## 2021-01-01 PROCEDURE — 88175 CYTOPATH C/V AUTO FLUID REDO: CPT | Performed by: NURSE PRACTITIONER

## 2021-01-01 PROCEDURE — 99213 OFFICE O/P EST LOW 20 MIN: CPT | Mod: 25 | Performed by: NURSE PRACTITIONER

## 2021-01-01 PROCEDURE — 97530 THERAPEUTIC ACTIVITIES: CPT | Mod: GP | Performed by: PHYSICAL THERAPIST

## 2021-01-01 PROCEDURE — 124N000002 HC R&B MH UMMC

## 2021-01-01 PROCEDURE — 80061 LIPID PANEL: CPT | Performed by: STUDENT IN AN ORGANIZED HEALTH CARE EDUCATION/TRAINING PROGRAM

## 2021-01-01 PROCEDURE — 84443 ASSAY THYROID STIM HORMONE: CPT | Performed by: EMERGENCY MEDICINE

## 2021-01-01 PROCEDURE — 87086 URINE CULTURE/COLONY COUNT: CPT | Mod: 90 | Performed by: PATHOLOGY

## 2021-01-01 PROCEDURE — 86618 LYME DISEASE ANTIBODY: CPT | Performed by: STUDENT IN AN ORGANIZED HEALTH CARE EDUCATION/TRAINING PROGRAM

## 2021-01-01 PROCEDURE — U0003 INFECTIOUS AGENT DETECTION BY NUCLEIC ACID (DNA OR RNA); SEVERE ACUTE RESPIRATORY SYNDROME CORONAVIRUS 2 (SARS-COV-2) (CORONAVIRUS DISEASE [COVID-19]), AMPLIFIED PROBE TECHNIQUE, MAKING USE OF HIGH THROUGHPUT TECHNOLOGIES AS DESCRIBED BY CMS-2020-01-R: HCPCS | Performed by: EMERGENCY MEDICINE

## 2021-01-01 PROCEDURE — 250N000013 HC RX MED GY IP 250 OP 250 PS 637: Performed by: STUDENT IN AN ORGANIZED HEALTH CARE EDUCATION/TRAINING PROGRAM

## 2021-01-01 PROCEDURE — 85390 FIBRINOLYSINS SCREEN I&R: CPT | Performed by: PATHOLOGY

## 2021-01-01 PROCEDURE — 97112 NEUROMUSCULAR REEDUCATION: CPT | Mod: GP | Performed by: PHYSICAL THERAPIST

## 2021-01-01 PROCEDURE — 85730 THROMBOPLASTIN TIME PARTIAL: CPT | Performed by: NURSE PRACTITIONER

## 2021-01-01 PROCEDURE — 87389 HIV-1 AG W/HIV-1&-2 AB AG IA: CPT | Performed by: PSYCHIATRY & NEUROLOGY

## 2021-01-01 PROCEDURE — 86780 TREPONEMA PALLIDUM: CPT | Performed by: STUDENT IN AN ORGANIZED HEALTH CARE EDUCATION/TRAINING PROGRAM

## 2021-01-01 PROCEDURE — 99223 1ST HOSP IP/OBS HIGH 75: CPT | Mod: AI | Performed by: PSYCHIATRY & NEUROLOGY

## 2021-01-01 PROCEDURE — 81003 URINALYSIS AUTO W/O SCOPE: CPT | Performed by: PHYSICIAN ASSISTANT

## 2021-01-01 PROCEDURE — 85613 RUSSELL VIPER VENOM DILUTED: CPT | Performed by: NURSE PRACTITIONER

## 2021-01-01 PROCEDURE — 36415 COLL VENOUS BLD VENIPUNCTURE: CPT | Performed by: NURSE PRACTITIONER

## 2021-01-01 PROCEDURE — 97535 SELF CARE MNGMENT TRAINING: CPT | Mod: GP | Performed by: PHYSICAL THERAPIST

## 2021-01-01 PROCEDURE — 97140 MANUAL THERAPY 1/> REGIONS: CPT | Mod: GP | Performed by: PHYSICAL THERAPIST

## 2021-01-01 PROCEDURE — 99214 OFFICE O/P EST MOD 30 MIN: CPT | Mod: 95 | Performed by: PHYSICIAN ASSISTANT

## 2021-01-01 PROCEDURE — 99291 CRITICAL CARE FIRST HOUR: CPT | Mod: 25 | Performed by: EMERGENCY MEDICINE

## 2021-01-01 PROCEDURE — 99395 PREV VISIT EST AGE 18-39: CPT | Performed by: NURSE PRACTITIONER

## 2021-01-01 PROCEDURE — 51600 INJECTION FOR BLADDER X-RAY: CPT | Performed by: PHYSICIAN ASSISTANT

## 2021-01-01 PROCEDURE — 85652 RBC SED RATE AUTOMATED: CPT | Performed by: STUDENT IN AN ORGANIZED HEALTH CARE EDUCATION/TRAINING PROGRAM

## 2021-01-01 PROCEDURE — 85025 COMPLETE CBC W/AUTO DIFF WBC: CPT | Performed by: EMERGENCY MEDICINE

## 2021-01-01 PROCEDURE — 81025 URINE PREGNANCY TEST: CPT | Performed by: EMERGENCY MEDICINE

## 2021-01-01 PROCEDURE — 93010 ELECTROCARDIOGRAM REPORT: CPT | Performed by: EMERGENCY MEDICINE

## 2021-01-01 PROCEDURE — 51741 ELECTRO-UROFLOWMETRY FIRST: CPT | Performed by: PHYSICIAN ASSISTANT

## 2021-01-01 PROCEDURE — 86038 ANTINUCLEAR ANTIBODIES: CPT | Performed by: NURSE PRACTITIONER

## 2021-01-01 PROCEDURE — 99285 EMERGENCY DEPT VISIT HI MDM: CPT | Mod: 25 | Performed by: EMERGENCY MEDICINE

## 2021-01-01 PROCEDURE — 93010 ELECTROCARDIOGRAM REPORT: CPT | Performed by: INTERNAL MEDICINE

## 2021-01-01 PROCEDURE — 93005 ELECTROCARDIOGRAM TRACING: CPT

## 2021-01-01 PROCEDURE — 81001 URINALYSIS AUTO W/SCOPE: CPT | Performed by: PATHOLOGY

## 2021-01-01 PROCEDURE — 86038 ANTINUCLEAR ANTIBODIES: CPT | Performed by: PSYCHIATRY & NEUROLOGY

## 2021-01-01 PROCEDURE — 82043 UR ALBUMIN QUANTITATIVE: CPT | Performed by: NURSE PRACTITIONER

## 2021-01-01 PROCEDURE — 36415 COLL VENOUS BLD VENIPUNCTURE: CPT | Performed by: EMERGENCY MEDICINE

## 2021-01-01 PROCEDURE — 80053 COMPREHEN METABOLIC PANEL: CPT | Performed by: EMERGENCY MEDICINE

## 2021-01-01 PROCEDURE — 51784 ANAL/URINARY MUSCLE STUDY: CPT | Performed by: PHYSICIAN ASSISTANT

## 2021-01-01 PROCEDURE — 99239 HOSP IP/OBS DSCHRG MGMT >30: CPT | Mod: GC | Performed by: PSYCHIATRY & NEUROLOGY

## 2021-01-01 PROCEDURE — 96360 HYDRATION IV INFUSION INIT: CPT | Performed by: EMERGENCY MEDICINE

## 2021-01-01 PROCEDURE — 36415 COLL VENOUS BLD VENIPUNCTURE: CPT | Performed by: STUDENT IN AN ORGANIZED HEALTH CARE EDUCATION/TRAINING PROGRAM

## 2021-01-01 PROCEDURE — 87624 HPV HI-RISK TYP POOLED RSLT: CPT | Performed by: NURSE PRACTITIONER

## 2021-01-01 PROCEDURE — G0177 OPPS/PHP; TRAIN & EDUC SERV: HCPCS

## 2021-01-01 RX ORDER — DIPHENHYDRAMINE HYDROCHLORIDE 50 MG/ML
50 INJECTION INTRAMUSCULAR; INTRAVENOUS EVERY 4 HOURS PRN
Status: DISCONTINUED | OUTPATIENT
Start: 2021-01-01 | End: 2021-01-01 | Stop reason: HOSPADM

## 2021-01-01 RX ORDER — QUETIAPINE FUMARATE 100 MG/1
100 TABLET, FILM COATED ORAL AT BEDTIME
Qty: 30 TABLET | Refills: 0 | Status: SHIPPED | OUTPATIENT
Start: 2021-01-01 | End: 2021-01-01

## 2021-01-01 RX ORDER — HYDROXYZINE HYDROCHLORIDE 25 MG/1
25-50 TABLET, FILM COATED ORAL EVERY 4 HOURS PRN
Status: DISCONTINUED | OUTPATIENT
Start: 2021-01-01 | End: 2021-01-01

## 2021-01-01 RX ORDER — OLANZAPINE 5 MG/1
5 TABLET ORAL AT BEDTIME
COMMUNITY

## 2021-01-01 RX ORDER — DIPHENHYDRAMINE HCL 50 MG
50 CAPSULE ORAL EVERY 4 HOURS PRN
Status: DISCONTINUED | OUTPATIENT
Start: 2021-01-01 | End: 2021-01-01 | Stop reason: HOSPADM

## 2021-01-01 RX ORDER — VITAMIN B COMPLEX
25 TABLET ORAL DAILY
Status: DISCONTINUED | OUTPATIENT
Start: 2021-01-01 | End: 2021-01-01 | Stop reason: HOSPADM

## 2021-01-01 RX ORDER — LANOLIN ALCOHOL/MO/W.PET/CERES
3 CREAM (GRAM) TOPICAL
Status: DISCONTINUED | OUTPATIENT
Start: 2021-01-01 | End: 2021-01-01 | Stop reason: HOSPADM

## 2021-01-01 RX ORDER — QUETIAPINE FUMARATE 50 MG/1
50 TABLET, FILM COATED ORAL AT BEDTIME
Status: DISCONTINUED | OUTPATIENT
Start: 2021-01-01 | End: 2021-01-01

## 2021-01-01 RX ORDER — DIPHENHYDRAMINE HYDROCHLORIDE 50 MG/ML
50 INJECTION INTRAMUSCULAR; INTRAVENOUS ONCE
Status: COMPLETED | OUTPATIENT
Start: 2021-01-01 | End: 2021-01-01

## 2021-01-01 RX ORDER — QUETIAPINE FUMARATE 100 MG/1
100-200 TABLET, FILM COATED ORAL
Qty: 40 TABLET | Refills: 0 | Status: SHIPPED | OUTPATIENT
Start: 2021-01-01 | End: 2021-01-01

## 2021-01-01 RX ORDER — AMOXICILLIN 250 MG
1 CAPSULE ORAL 2 TIMES DAILY PRN
Status: DISCONTINUED | OUTPATIENT
Start: 2021-01-01 | End: 2021-01-01 | Stop reason: HOSPADM

## 2021-01-01 RX ORDER — ACETAMINOPHEN 325 MG/1
650 TABLET ORAL EVERY 4 HOURS PRN
Status: DISCONTINUED | OUTPATIENT
Start: 2021-01-01 | End: 2021-01-01 | Stop reason: HOSPADM

## 2021-01-01 RX ORDER — LAMOTRIGINE 25 MG/1
TABLET ORAL
COMMUNITY
Start: 2021-01-01

## 2021-01-01 RX ORDER — MAGNESIUM HYDROXIDE/ALUMINUM HYDROXICE/SIMETHICONE 120; 1200; 1200 MG/30ML; MG/30ML; MG/30ML
30 SUSPENSION ORAL EVERY 4 HOURS PRN
Status: DISCONTINUED | OUTPATIENT
Start: 2021-01-01 | End: 2021-01-01 | Stop reason: HOSPADM

## 2021-01-01 RX ORDER — TROSPIUM CHLORIDE 20 MG/1
20 TABLET, FILM COATED ORAL
Status: DISCONTINUED | OUTPATIENT
Start: 2021-01-01 | End: 2021-01-01

## 2021-01-01 RX ORDER — HALOPERIDOL 5 MG/1
5 TABLET ORAL EVERY 4 HOURS PRN
Status: DISCONTINUED | OUTPATIENT
Start: 2021-01-01 | End: 2021-01-01 | Stop reason: HOSPADM

## 2021-01-01 RX ORDER — HALOPERIDOL 5 MG/ML
5 INJECTION INTRAMUSCULAR EVERY 4 HOURS PRN
Status: DISCONTINUED | OUTPATIENT
Start: 2021-01-01 | End: 2021-01-01 | Stop reason: HOSPADM

## 2021-01-01 RX ORDER — SODIUM CHLORIDE 9 MG/ML
INJECTION, SOLUTION INTRAVENOUS CONTINUOUS
Status: DISCONTINUED | OUTPATIENT
Start: 2021-01-01 | End: 2021-01-01

## 2021-01-01 RX ORDER — LORAZEPAM 2 MG/ML
1 INJECTION INTRAMUSCULAR ONCE
Status: COMPLETED | OUTPATIENT
Start: 2021-01-01 | End: 2021-01-01

## 2021-01-01 RX ORDER — QUETIAPINE FUMARATE 100 MG/1
100 TABLET, FILM COATED ORAL AT BEDTIME
Status: DISCONTINUED | OUTPATIENT
Start: 2021-01-01 | End: 2021-01-01 | Stop reason: HOSPADM

## 2021-01-01 RX ORDER — QUETIAPINE FUMARATE 100 MG/1
100 TABLET, FILM COATED ORAL
Status: DISCONTINUED | OUTPATIENT
Start: 2021-01-01 | End: 2021-01-01 | Stop reason: HOSPADM

## 2021-01-01 RX ORDER — QUETIAPINE FUMARATE 50 MG/1
50 TABLET, FILM COATED ORAL 3 TIMES DAILY PRN
Status: DISCONTINUED | OUTPATIENT
Start: 2021-01-01 | End: 2021-01-01 | Stop reason: HOSPADM

## 2021-01-01 RX ORDER — FERROUS GLUCONATE 324(38)MG
324 TABLET ORAL
COMMUNITY
Start: 2021-01-01

## 2021-01-01 RX ORDER — TOLTERODINE TARTRATE 2 MG/1
2 TABLET, EXTENDED RELEASE ORAL AT BEDTIME
Status: DISCONTINUED | OUTPATIENT
Start: 2021-01-01 | End: 2021-01-01 | Stop reason: HOSPADM

## 2021-01-01 RX ORDER — TROSPIUM CHLORIDE 20 MG/1
20 TABLET, FILM COATED ORAL AT BEDTIME
Qty: 90 TABLET | Refills: 3 | Status: SHIPPED | OUTPATIENT
Start: 2021-01-01 | End: 2021-01-01

## 2021-01-01 RX ORDER — OLANZAPINE 10 MG/2ML
5-10 INJECTION, POWDER, FOR SOLUTION INTRAMUSCULAR 3 TIMES DAILY PRN
Status: DISCONTINUED | OUTPATIENT
Start: 2021-01-01 | End: 2021-01-01

## 2021-01-01 RX ORDER — FERROUS GLUCONATE 324(38)MG
324 TABLET ORAL
Status: DISCONTINUED | OUTPATIENT
Start: 2021-01-01 | End: 2021-01-01 | Stop reason: HOSPADM

## 2021-01-01 RX ORDER — LANOLIN ALCOHOL/MO/W.PET/CERES
3 CREAM (GRAM) TOPICAL AT BEDTIME
Qty: 30 TABLET | Refills: 0 | Status: SHIPPED | OUTPATIENT
Start: 2021-01-01 | End: 2021-01-01

## 2021-01-01 RX ORDER — QUETIAPINE FUMARATE 50 MG/1
TABLET, FILM COATED ORAL
Qty: 90 TABLET | Status: ON HOLD | OUTPATIENT
Start: 2021-01-01 | End: 2021-01-01

## 2021-01-01 RX ORDER — TROSPIUM CHLORIDE 20 MG/1
20 TABLET, FILM COATED ORAL AT BEDTIME
Qty: 90 TABLET | Refills: 0 | Status: SHIPPED | OUTPATIENT
Start: 2021-01-01 | End: 2021-01-01

## 2021-01-01 RX ORDER — TROSPIUM CHLORIDE 20 MG/1
20 TABLET, FILM COATED ORAL AT BEDTIME
Qty: 90 TABLET | Refills: 2 | Status: SHIPPED | OUTPATIENT
Start: 2021-01-01

## 2021-01-01 RX ORDER — LORAZEPAM 1 MG/1
1 TABLET ORAL EVERY 4 HOURS PRN
Status: DISCONTINUED | OUTPATIENT
Start: 2021-01-01 | End: 2021-01-01 | Stop reason: HOSPADM

## 2021-01-01 RX ORDER — OLANZAPINE 10 MG/1
10 TABLET ORAL
Qty: 30 TABLET | Refills: 0 | Status: SHIPPED | OUTPATIENT
Start: 2021-01-01 | End: 2021-01-01 | Stop reason: SINTOL

## 2021-01-01 RX ORDER — OLANZAPINE 10 MG/2ML
5-10 INJECTION, POWDER, FOR SOLUTION INTRAMUSCULAR 3 TIMES DAILY PRN
Status: DISCONTINUED | OUTPATIENT
Start: 2021-01-01 | End: 2021-01-01 | Stop reason: HOSPADM

## 2021-01-01 RX ORDER — OLANZAPINE 5 MG/1
5-10 TABLET ORAL 3 TIMES DAILY PRN
Status: DISCONTINUED | OUTPATIENT
Start: 2021-01-01 | End: 2021-01-01

## 2021-01-01 RX ORDER — PAROXETINE 40 MG/1
40 TABLET, FILM COATED ORAL DAILY
Qty: 90 TABLET | Status: ON HOLD | OUTPATIENT
Start: 2021-01-01 | End: 2021-01-01

## 2021-01-01 RX ORDER — OLANZAPINE 5 MG/1
5-10 TABLET ORAL 3 TIMES DAILY PRN
Status: DISCONTINUED | OUTPATIENT
Start: 2021-01-01 | End: 2021-01-01 | Stop reason: HOSPADM

## 2021-01-01 RX ORDER — LORAZEPAM 2 MG/ML
1 INJECTION INTRAMUSCULAR EVERY 4 HOURS PRN
Status: DISCONTINUED | OUTPATIENT
Start: 2021-01-01 | End: 2021-01-01 | Stop reason: HOSPADM

## 2021-01-01 RX ADMIN — QUETIAPINE FUMARATE 50 MG: 50 TABLET ORAL at 20:50

## 2021-01-01 RX ADMIN — QUETIAPINE FUMARATE 100 MG: 100 TABLET ORAL at 22:29

## 2021-01-01 RX ADMIN — TOLTERODINE TARTRATE 2 MG: 2 TABLET, FILM COATED ORAL at 22:29

## 2021-01-01 RX ADMIN — Medication 25 MCG: at 07:40

## 2021-01-01 RX ADMIN — LORAZEPAM 1 MG: 2 INJECTION INTRAMUSCULAR; INTRAVENOUS at 10:44

## 2021-01-01 RX ADMIN — FERROUS GLUCONATE 324 MG: 324 TABLET ORAL at 09:28

## 2021-01-01 RX ADMIN — TOLTERODINE TARTRATE 2 MG: 2 TABLET, FILM COATED ORAL at 21:28

## 2021-01-01 RX ADMIN — QUETIAPINE FUMARATE 100 MG: 100 TABLET ORAL at 21:28

## 2021-01-01 RX ADMIN — Medication 25 MCG: at 09:28

## 2021-01-01 RX ADMIN — POLYETHYLENE GLYCOL AND PROPYLENE GLYCOL 1 DROP: 4; 3 SOLUTION/ DROPS OPHTHALMIC at 21:52

## 2021-01-01 RX ADMIN — FERROUS GLUCONATE 324 MG: 324 TABLET ORAL at 09:21

## 2021-01-01 RX ADMIN — QUETIAPINE FUMARATE 100 MG: 100 TABLET ORAL at 20:33

## 2021-01-01 RX ADMIN — Medication 25 MCG: at 09:20

## 2021-01-01 RX ADMIN — Medication 25 MCG: at 10:00

## 2021-01-01 RX ADMIN — TOLTERODINE TARTRATE 2 MG: 2 TABLET, FILM COATED ORAL at 20:33

## 2021-01-01 RX ADMIN — DIPHENHYDRAMINE HYDROCHLORIDE 50 MG: 50 INJECTION INTRAMUSCULAR; INTRAVENOUS at 10:44

## 2021-01-01 RX ADMIN — POLYETHYLENE GLYCOL AND PROPYLENE GLYCOL 1 DROP: 4; 3 SOLUTION/ DROPS OPHTHALMIC at 19:25

## 2021-01-01 RX ADMIN — FERROUS GLUCONATE 324 MG: 324 TABLET ORAL at 07:40

## 2021-01-01 RX ADMIN — SODIUM CHLORIDE 1000 ML: 9 INJECTION, SOLUTION INTRAVENOUS at 15:54

## 2021-01-01 RX ADMIN — FERROUS GLUCONATE 324 MG: 324 TABLET ORAL at 10:00

## 2021-01-01 RX ADMIN — POLYETHYLENE GLYCOL AND PROPYLENE GLYCOL 1 DROP: 4; 3 SOLUTION/ DROPS OPHTHALMIC at 07:40

## 2021-01-01 ASSESSMENT — ACTIVITIES OF DAILY LIVING (ADL)
ORAL_HYGIENE: INDEPENDENT
LAUNDRY: UNABLE TO COMPLETE
ORAL_HYGIENE: INDEPENDENT
DEPENDENT_IADLS:: INDEPENDENT
HYGIENE/GROOMING: HANDWASHING;INDEPENDENT
ORAL_HYGIENE: INDEPENDENT
DRESS: INDEPENDENT;SCRUBS (BEHAVIORAL HEALTH)
ORAL_HYGIENE: INDEPENDENT
ORAL_HYGIENE: INDEPENDENT
DRESS: SCRUBS (BEHAVIORAL HEALTH);INDEPENDENT
LAUNDRY: UNABLE TO COMPLETE
HYGIENE/GROOMING: INDEPENDENT
LAUNDRY: UNABLE TO COMPLETE
LAUNDRY: UNABLE TO COMPLETE
ORAL_HYGIENE: INDEPENDENT
LAUNDRY: UNABLE TO COMPLETE
LAUNDRY: UNABLE TO COMPLETE
HYGIENE/GROOMING: HANDWASHING;INDEPENDENT
HYGIENE/GROOMING: INDEPENDENT
DRESS: SCRUBS (BEHAVIORAL HEALTH)
HYGIENE/GROOMING: INDEPENDENT
DRESS: SCRUBS (BEHAVIORAL HEALTH);INDEPENDENT
ORAL_HYGIENE: INDEPENDENT
DRESS: SCRUBS (BEHAVIORAL HEALTH);INDEPENDENT
LAUNDRY: WITH SUPERVISION
HYGIENE/GROOMING: INDEPENDENT
DRESS: SCRUBS (BEHAVIORAL HEALTH);INDEPENDENT
HYGIENE/GROOMING: INDEPENDENT
DRESS: SCRUBS (BEHAVIORAL HEALTH)

## 2021-01-01 ASSESSMENT — ENCOUNTER SYMPTOMS
EYE PAIN: 0
FREQUENCY: 0
HEMATOCHEZIA: 0
DIARRHEA: 0
ARTHRALGIAS: 0
DIARRHEA: 0
PALPITATIONS: 0
NERVOUS/ANXIOUS: 0
HEADACHES: 0
HEARTBURN: 0
NERVOUS/ANXIOUS: 0
ARTHRALGIAS: 0
SORE THROAT: 0
COUGH: 0
DIZZINESS: 0
PARESTHESIAS: 0
EYE PAIN: 0
SHORTNESS OF BREATH: 0
CONSTIPATION: 0
HEMATOCHEZIA: 0
DYSURIA: 0
CHILLS: 0
NAUSEA: 0
WEAKNESS: 0
FREQUENCY: 0
WEAKNESS: 0
NAUSEA: 0
HEMATURIA: 0
HEARTBURN: 0
PARESTHESIAS: 0
COUGH: 0
MYALGIAS: 0
ABDOMINAL PAIN: 0
BREAST MASS: 0
SORE THROAT: 0
CHILLS: 0
SHORTNESS OF BREATH: 0
BREAST MASS: 0
PALPITATIONS: 0
HEMATURIA: 0
ABDOMINAL PAIN: 0
FEVER: 0
MYALGIAS: 0
DYSURIA: 0
CONSTIPATION: 0
JOINT SWELLING: 0
FEVER: 0
JOINT SWELLING: 0
HEADACHES: 0
DIZZINESS: 0

## 2021-01-01 ASSESSMENT — MIFFLIN-ST. JEOR
SCORE: 1211.13
SCORE: 1192.98
SCORE: 1220.74

## 2021-01-01 ASSESSMENT — PAIN SCALES - GENERAL: PAINLEVEL: NO PAIN (0)

## 2021-04-14 NOTE — PROGRESS NOTES
SUBJECTIVE:   CC: Mikaela Esquivel is an 41 year old woman who presents for preventive health visit.       Patient has been advised of split billing requirements and indicates understanding: Yes  Healthy Habits:     Getting at least 3 servings of Calcium per day:  Yes    Bi-annual eye exam:  NO    Dental care twice a year:  Yes    Sleep apnea or symptoms of sleep apnea:  Daytime drowsiness    Diet:  Regular (no restrictions)    Frequency of exercise:  1 day/week    Duration of exercise:  15-30 minutes    Taking medications regularly:  Yes    Medication side effects:  None    PHQ-2 Total Score: 1    Additional concerns today:  Yes    She continues to have issues with debris collecting in her tonsils, difficulty removing it, smelly.  She never followed up on her ENT referral in the past, but would like to now.    Lipoma on back - possibly getting larger.  Not painful.    She has been feeling more depressed the past week, due to social unrest, weather.  Overall, she feels that her current dose of Paxil is working well.  She is sleeping well with her current dose of Seroquel.    Her ferritin was low in November.  She has been taking an iron supplement since then.         Today's PHQ-2 Score:   PHQ-2 ( 1999 Pfizer) 4/12/2021   Q1: Little interest or pleasure in doing things 1   Q2: Feeling down, depressed or hopeless 0   PHQ-2 Score 1   Q1: Little interest or pleasure in doing things Several days   Q2: Feeling down, depressed or hopeless Not at all   PHQ-2 Score 1       Abuse: Current or Past (Physical, Sexual or Emotional) - No  Do you feel safe in your environment? Yes    Have you ever done Advance Care Planning? (For example, a Health Directive, POLST, or a discussion with a medical provider or your loved ones about your wishes): No, advance care planning information given to patient to review.  Patient declined advance care planning discussion at this time.    Social History     Tobacco Use     Smoking status:  Never Smoker     Smokeless tobacco: Never Used   Substance Use Topics     Alcohol use: Yes     Comment: occasionally: 2 or less per week      If you drink alcohol do you typically have >3 drinks per day or >7 drinks per week? No    Alcohol Use 4/14/2021   Prescreen: >3 drinks/day or >7 drinks/week? -   Prescreen: >3 drinks/day or >7 drinks/week? No       Reviewed orders with patient.  Reviewed health maintenance and updated orders accordingly - Yes      Breast Cancer Screening:  Any new diagnosis of family breast, ovarian, or bowel cancer? No    FSH-7: No flowsheet data found.      Pertinent mammograms are reviewed under the imaging tab.    History of abnormal Pap smear: YES - updated in Problem List and Health Maintenance accordingly  PAP / HPV Latest Ref Rng & Units 3/3/2020 1/7/2019 5/4/2016   PAP - NIL NIL NIL   HPV 16 DNA NEG:Negative Negative Negative Negative   HPV 18 DNA NEG:Negative Negative Negative Negative   OTHER HR HPV NEG:Negative Positive(A) Positive(A) Negative     Reviewed and updated as needed this visit by clinical staff  Tobacco  Allergies  Meds   Med Hx    Soc Hx        Reviewed and updated as needed this visit by Provider                    Review of Systems   Constitutional: Negative for chills and fever.   HENT: Negative for congestion, ear pain, hearing loss and sore throat.    Eyes: Negative for pain and visual disturbance.   Respiratory: Negative for cough and shortness of breath.    Cardiovascular: Negative for chest pain, palpitations and peripheral edema.   Gastrointestinal: Negative for abdominal pain, constipation, diarrhea, heartburn, hematochezia and nausea.   Breasts:  Negative for tenderness, breast mass and discharge.   Genitourinary: Negative for dysuria, frequency, genital sores, hematuria, pelvic pain, urgency, vaginal bleeding and vaginal discharge.   Musculoskeletal: Negative for arthralgias, joint swelling and myalgias.   Skin: Negative for rash.   Neurological:  "Negative for dizziness, weakness, headaches and paresthesias.   Psychiatric/Behavioral: Negative for mood changes. The patient is not nervous/anxious.           OBJECTIVE:   /80 (BP Location: Right arm, Patient Position: Sitting, Cuff Size: Adult Regular)   Pulse 61   Temp 98.6  F (37  C) (Tympanic)   Resp 14   Ht 1.6 m (5' 3\")   Wt 57.7 kg (127 lb 1.9 oz)   LMP 03/22/2021 (Approximate)   SpO2 97%   BMI 22.52 kg/m    Physical Exam  GENERAL: healthy, alert and no distress  EYES: Eyes grossly normal to inspection, PERRL and conjunctivae and sclerae normal  HENT: normal cephalic/atraumatic, ear canals and TM's normal, nose and mouth without ulcers or lesions and tonsillar hypertrophy/cryptic tonsils  NECK: no adenopathy, no asymmetry, masses, or scars and thyroid normal to palpation  RESP: lungs clear to auscultation - no rales, rhonchi or wheezes  CV: regular rate and rhythm, normal S1 S2, no S3 or S4, no murmur, click or rub, no peripheral edema and peripheral pulses strong  ABDOMEN: soft, nontender, no hepatosplenomegaly, no masses and bowel sounds normal  MS: no gross musculoskeletal defects noted, no edema  SKIN: lipoma left scapula  NEURO: Normal strength and tone, mentation intact and speech normal  PSYCH: mentation appears normal, affect normal/bright        ASSESSMENT/PLAN:   (Z00.00) Routine general medical examination at a health care facility  (primary encounter diagnosis)  Comment:   Plan:     (F41.1) GENERALIZED ANXIETY DIS  Comment: stable  Plan: PARoxetine (PAXIL) 40 MG tablet, QUEtiapine         (SEROQUEL) 50 MG tablet        .cotn     (E61.1) Iron deficiency  Comment:   Plan: Ferritin        Will recheck ferritin.  Continue on iron if normal.     (J35.8) Tonsillolith  Comment:   Plan: OTOLARYNGOLOGY REFERRAL        Referral to ENT given.       Patient has been advised of split billing requirements and indicates understanding:   COUNSELING:  Reviewed preventive health counseling, as " "reflected in patient instructions    Estimated body mass index is 22.52 kg/m  as calculated from the following:    Height as of this encounter: 1.6 m (5' 3\").    Weight as of this encounter: 57.7 kg (127 lb 1.9 oz).        She reports that she has never smoked. She has never used smokeless tobacco.      Counseling Resources:  ATP IV Guidelines  Pooled Cohorts Equation Calculator  Breast Cancer Risk Calculator  BRCA-Related Cancer Risk Assessment: FHS-7 Tool  FRAX Risk Assessment  ICSI Preventive Guidelines  Dietary Guidelines for Americans, 2010  USDA's MyPlate  ASA Prophylaxis  Lung CA Screening    "

## 2021-08-23 NOTE — LETTER
August 23, 2021      Mikaela Esquivel  [address]   [facility] [address]      Dear ,    This letter is to remind you that you are due for your follow-up Pap smear and Human Papillomavirus (HPV) test on or after 09/09/21.    Please call [phone] to schedule your appointment at your earliest convenience.    If you have completed the appointment outside of the [facility] system, please have the records forwarded to our office. We will update your chart for your provider to review before your next annual wellness visit.     Thank you for choosing [facility]!      Sincerely,    Your [facility] Care Team

## 2021-09-06 NOTE — TELEPHONE ENCOUNTER
Muscarinic Antagonists (Urinary Incontinence Agents) [id number]/03/2021 08:02 AM   Recent (12 mo) or future (30 days) visit within the authorizing provider's specialty Protocol Details    Patient does not have a diagnosis of glaucoma on the problem list     Medication is active on med list     Patient is 18 years of age or older

## 2021-09-09 PROBLEM — F29 PSYCHOSIS, UNSPECIFIED PSYCHOSIS TYPE (H): Status: ACTIVE | Noted: 2021-01-01
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
## 2021-09-09 NOTE — ED NOTES
ED to Behavioral Floor Handoff    SITUATION  Mikaela Esquivel is a 41 year old female who speaks English and lives in a home alone The patient arrived in the ED by private car from home with a complaint of Delusional (Parent state pt was fine 2 days ago and found her manic like this today.)  .The patient's current symptoms started/worsened 3 day(s) ago and during this time the symptoms have increased.   In the ED, pt was diagnosed with   Final diagnoses:   Psychosis, unspecified psychosis type (H)        Initial vitals were: BP: 102/42  Pulse: 105  Resp: 21  SpO2: 95 %   --------  Is the patient diabetic? No   If yes, last blood glucose? --     If yes, was this treated in the ED? --  --------  Is the patient inebriated (ETOH) No or Impaired on other substances? No  MSSA done? No  Last MSSA score: --    Were withdrawal symptoms treated? N/A  Does the patient have a seizure history? No. If yes, date of most recent seizure--  --------  Is the patient patient experiencing suicidal ideation? denies current or recent suicidal ideation     Homicidal ideation? denies current or recent homicidal ideation or behaviors.    Self-injurious behavior/urges? denies current or recent self injurious behavior or ideation.  ------  Was pt aggressive in the ED No  Was a code called No  Is the pt now cooperative? Yes  -------  Meds given in ED:   Medications   0.9% sodium chloride BOLUS (1,000 mLs Intravenous New Bag 9/9/21 4906)     Followed by   sodium chloride 0.9% infusion (has no administration in time range)   LORazepam (ATIVAN) injection 1 mg (1 mg Intramuscular Given 9/9/21 1044)   diphenhydrAMINE (BENADRYL) injection 50 mg (50 mg Intramuscular Given 9/9/21 1044)      Family present during ED course? Yes  Family currently present? Yes    BACKGROUND  Does the patient have a cognitive impairment or developmental disability? No  Allergies: No Known Allergies.   Social demographics are   Social History     Socioeconomic History      Marital status: Single     Spouse name: None     Number of children: None     Years of education: None     Highest education level: None   Occupational History     None   Tobacco Use     Smoking status: Never Smoker     Smokeless tobacco: Never Used   Substance and Sexual Activity     Alcohol use: Yes     Comment: occasionally: 2 or less per week      Drug use: No     Sexual activity: Yes     Partners: Male     Birth control/protection: None, Condom   Other Topics Concern     Parent/sibling w/ CABG, MI or angioplasty before 65F 55M? No   Social History Narrative    Dairy/d 2-3 servings/d.     Caffeine 0 servings/d    Exercise 3 x week biking,play frisbee    Sunscreen used - Yes    Seatbelts used - Yes    Working smoke/CO detectors in the home - Yes    Guns stored in the home - No    Self Breast Exams - No    Self Testicular Exam - NOT APPLICABLE    Eye Exam up to date - yes    Dental Exam up to date - Yes    Pap Smear up to date - No    Mammogram up to date - NOT APPLICABLE    PSA up to date - NOT APPLICABLE    Dexa Scan up to date - NOT APPLICABLE    Flex Sig / Colonoscopy up to date - NOT APPLICABLE    Immunizations up to date - Yes-Td 2005    Abuse: Current or Past(Physical, Sexual or Emotional)- No    Do you feel safe in your environment - Yes     Social Determinants of Health     Financial Resource Strain:      Difficulty of Paying Living Expenses:    Food Insecurity:      Worried About Running Out of Food in the Last Year:      Ran Out of Food in the Last Year:    Transportation Needs:      Lack of Transportation (Medical):      Lack of Transportation (Non-Medical):    Physical Activity:      Days of Exercise per Week:      Minutes of Exercise per Session:    Stress:      Feeling of Stress :    Social Connections:      Frequency of Communication with Friends and Family:      Frequency of Social Gatherings with Friends and Family:      Attends [employer] Services:      Active Member of Clubs or Organizations:       Attends Club or Organization Meetings:      Marital Status:    Intimate Partner Violence:      Fear of Current or Ex-Partner:      Emotionally Abused:      Physically Abused:      Sexually Abused:         ASSESSMENT  Labs results   Labs Ordered and Resulted from Time of ED Arrival Up to the Time of Departure from the ED   COMPREHENSIVE METABOLIC PANEL - Abnormal; Notable for the following components:       Result Value    Urea Nitrogen 6 (*)     All other components within normal limits   HCG QUALITATIVE URINE - Normal   TSH WITH FREE T4 REFLEX - Normal   CBC WITH PLATELETS & DIFFERENTIAL    Narrative:     The following orders were created for panel order CBC with platelets differential.  Procedure                               Abnormality         Status                     ---------                               -----------         ------                     CBC with platelets and d...[[id number]]                      Final result                 Please view results for these tests on the individual orders.   CBC WITH PLATELETS AND DIFFERENTIAL   DRUG ABUSE SCREEN 1 URINE (ED)   COVID-19 VIRUS (CORONAVIRUS) BY PCR   EXTRA RED TOP TUBE   CARDIAC CONTINUOUS MONITORING   DOCUMENT   URINE DRUGS OF ABUSE SCREEN    Narrative:     The following orders were created for panel order Urine Drugs of Abuse Screen.  Procedure                               Abnormality         Status                     ---------                               -----------         ------                     Drug abuse screen 1 urin...[[id number]]                                                   Please view results for these tests on the individual orders.   EXTRA TUBE    Narrative:     The following orders were created for panel order Extra Tube.  Procedure                               Abnormality         Status                     ---------                               -----------         ------                     Extra Red Top  Tube[[id number]]                               In process                   Please view results for these tests on the individual orders.      Imaging Studies: No results found for this or any previous visit (from the past 24 hour(s)).   Most recent vital signs /60   Pulse 103   Resp 22   LMP  (LMP Unknown)   SpO2 95%    Abnormal labs/tests/findings requiring intervention:---   Pain control: pt had none  Nausea control: pt had none    RECOMMENDATION  Are any infection precautions needed (MRSA, VRE, etc.)? No If yes, what infection? --  ---  Does the patient have mobility issues? independently. If yes, what device does the pt use? ---  ---  Is patient on 72 hour hold or commitment? Yes If on 72 hour hold, have hold and rights been given to patient? yes  Are admitting orders written if after 10 p.m. ?N/A  Tasks needing to be completed:---     Michelle Anderson RN   ascom--    [phone] [place] [facility]   [phone] [facility]
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
## 2021-09-09 NOTE — TELEPHONE ENCOUNTER
"S: 12:45p, Jessica w/ Dec called Intake w/ clinical on a 41y/F present in the [facility] presenting with psychosis.    B:  The pt is currently at the [facility] presenting w/ psychosis. The pt was brought in by EMS after the pt's parents requested a safety check. The pt was throwing and breaking things in her apartment. The pt was given sedating medication upon arrival x2 and in the EMS.The pt has been placed in restraints. The pt has stated in the ER and to the  \"I am God\", God is real\", \"I am a baby\", \"It is amazing to be reincarnated\".  The pt's speech is rambled w/ disorganized thought. 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 The pt has not sleep in that 2 days reported by the pt's parents.     The pt's family denies any substance use, the  was not able to ask the pt directly.     The pt has not been IP for MH before per parent, the  was not able to ask the pt directly.     The pts MH Hx includes anxiety, depression and OCD.     The pt is Rx MH medications, it is unknown what they are. It is unknown if the pt is compliant.     It is unknown if the pt has medication management provider.    It is unknown if the pt has a therapist.    There is concern for aggression this visit. There is no concern for HI.    Per DEC  the pt can ambulate independently.     Per DEC  the pt is indep with ADLs     The pt does not have any known medical concerns.     Covid needs to be collected.  Utox needs to be collected.  Pregnancy test resulted as negative.    A: 72 hr hold is planned; per DEC  the provider will be placing the pt on a hold. No order currently in EPIC    R: The pt is currently in the [facility] awaiting bed placement.    2:32p Intake awaiting labs for bed placement. The pt has been added to the work-list. Intake working on identifying appropriate bed placement.     3:08p Intake checked on labs for bed placement- labs need " to be collected.    3:08p Intake called [facility] RN to collect labs for placement.     3:52p Intake checked on labs for bed placement. Covid is in process.     3:54p Intake paged provider to present for Unit 12 (Shukri).    4:12 Provider returned intakes page- provider accepts the pt pending a negative Covid and basic lab work is in normal reference ranges.     4:19p Intake called Unit 12 Charge RN to go over admission in que.    4:20p Intake called [facility] to go over bed placement information.

## 2021-09-09 NOTE — PROGRESS NOTES
09/09/21 1720   Patient Belongings   Did you bring any home meds/supplements to the hospital?  No   Patient Belongings locker   Patient Belongings Put in Hospital Secure Location (Security or Locker, etc.) clothing   Belongings Search Yes   Clothing Search Yes   Second Staff Maria Elena Chong     Items to secured locker:  -1 michael shirt   -1 pair of underwear  -one pair of pants   -1 black cell phone with protective case    A               Admission:  I am responsible for any personal items that are not sent to the safe or pharmacy.  [facility] is not responsible for loss, theft or damage of any property in my possession.    Signature:  _________________________________ Date: _______  Time: _____                                              Staff Signature:  ____________________________ Date: ________  Time: _____      2nd Staff person, if patient is unable/unwilling to sign:    Signature: ________________________________ Date: ________  Time: _____     Discharge:  Rolla has returned all of my personal belongings:    Signature: _________________________________ Date: ________  Time: _____                                          Staff Signature:  ____________________________ Date: ________  Time: _____

## 2021-09-09 NOTE — ED NOTES
Bed: ED11  Expected date:   Expected time:   Means of arrival:   Comments:  [facility] 41F Manic

## 2021-09-09 NOTE — ED TRIAGE NOTES
Per EMS, parents come to her home to check on her. She was delusional and not able to track. She is manic, When PD arrived she began throwing things. On arrival to ED she is struggling against the cuffs with a constant stream of speech which does not make sense

## 2021-09-09 NOTE — ED NOTES
Within an hour after restraint an in person face to face assessment was completed at 1136, including an evaluation of the patient's immediate reaction to the intervention, behavioral assessment and review/assessment of history, drugs and medications, recent labs, etc., and behavioral condition.  The patient experienced: No adverse physical outcome from seclusion/restraint initiation.  The intervention of restraint or seclusion needs to terminate.     Garth Lucero MD  09/09/21 [id number]

## 2021-09-09 NOTE — ED NOTES
Pt is struggling and squirming on the EMS cart. She is looking into space and appears to be responding to internal stimuli. She does not listen to or pay attention to staff. She cannot follow instructions. MD has been to bedside. Code 21 called to get her safely off ambulance cart

## 2021-09-09 NOTE — ED NOTES
Pt up to commode with assist of 2 staff.  Pt urinated 800 ml clear light yellow urine then became calm and layed down on the cart and wanted to sleep.  Pt out of restraints @ 1127.  Pt remains on 1:1 per Dr Lucero.  Pt not able to express herself to make her needs known.  Pt continues on the cardiac monitor per MD.  Pt knew she was in the ED but was not able to say anything else.

## 2021-09-09 NOTE — ED PROVIDER NOTES
"    [facility] EMERGENCY DEPARTMENT ([facility])    9/09/21        History     Chief Complaint   Patient presents with     Delusional     Parent state pt was fine 2 days ago and found her manic like this today.     The history is provided by medical records, the EMS personnel and a parent.     Mikaela Esquivel is a 41 year old female with a history of anxiety and depression who presents to the Emergency Department via EMS delusional. Per EMS, patient was rambling on and not answering questions. They state they had to carry her out as she was struggling. Patient was manic, irrational, and breaking things in the apartment. Patient was given droperidol 10 mg around 10 am by EMS. Here in the ED, patient states \"I am God\", \"God is real\", and \"I'm like a baby.\" She continues to repeat \"I really want to win this one.\" Patient also states it is an \"amazing feeling to be reincarnated multiple times.\" She states \"I'm so glad I see the light\" and \"we're all connected, beam me up ava.\" Patient believes the year is 1987. She denies SI.    Per parents, patient is receiving mental health services at [facility]. She has a psychologist and psychiatrist that prescribe medication. Father reports yesterday he got a call from the patient stating she was on [address] and she was scared and worried someone was going to take her bag and computer. Her parents picked her up and brought her to their home. They state she had grandiose ideas about fixing [place], she then seemed more rational, and then returned to these grandiose ideas. Patient had told parents she had not slept in 24 hours. Parents then brought patient to her home and she told parents she would call them in the morning but parents got woken up by a call from patient's friend this morning. Patient's friend told parents that patient was in a manic state and was going on and on. Parents states patient called everyone she knew this morning " "with some sort of grand plan. Parents reports patient also told her friends this morning that her parents barged into her apartment and she needed to be protected from them. Patient has never acted this way before. They note she has had a meltdown in the past but not to this extent that they know of. Parents reports patient was at a \"financial retreat\" this weekend.    Past Medical History  Past Medical History:   Diagnosis Date     ASCUS favor benign 1/2013    Neg HR HPV     Cervical high risk HPV (human papillomavirus) test positive 7/2015, 01/07/19, 03/03/20    See problem list.      Depressive disorder, not elsewhere classified      Generalized anxiety disorder      Urgency of urination      Past Surgical History:   Procedure Laterality Date     EYE SURGERY      LASIK     SURGICAL HISTORY OF -   2010    eye surgery     No current outpatient medications on file.    No Known Allergies  Family History  Family History   Adopted: Yes   Problem Relation Age of Onset     Unknown/Adopted Mother      Unknown/Adopted Father      Unknown/Adopted Maternal Grandmother      Family History Negative No family hx of         Pt adopted     Social History   Social History     Tobacco Use     Smoking status: Never Smoker     Smokeless tobacco: Never Used   Substance Use Topics     Alcohol use: Yes     Comment: occasionally: 2 or less per week      Drug use: No      Past medical history, past surgical history, medications, allergies, family history, and social history were reviewed with the patient. No additional pertinent items.       Review of Systems   Unable to perform ROS: Mental status change (delusional)   Psychiatric/Behavioral: Negative for suicidal ideas.     A complete review of systems was performed with pertinent positives and negatives noted in the HPI, and all other systems negative.    Physical Exam   BP: 102/42  Pulse: 105  Temp: 98.1  F (36.7  C)  Resp: 21  SpO2: 95 %  Standing Orthostatic BP: 123/88  Standing " Orthostatic Pulse: 105 bpm  Physical Exam  Vitals and nursing note reviewed.   Constitutional:       General: She is not in acute distress.     Appearance: She is well-developed. She is not ill-appearing, toxic-appearing or diaphoretic.      Comments: At presentation, patient is awake and is restrained on EMS cart.  She demonstrates psychomotor agitation and seems to be responding to internal stimuli.  She will follow commands but does not answer questions appropriately.  She seems to be fixated on the fact that she is God and at the same time a baby.   HENT:      Head: Normocephalic and atraumatic.      Mouth/Throat:      Lips: Pink.      Mouth: Mucous membranes are moist.      Pharynx: Oropharynx is clear. No oropharyngeal exudate.   Eyes:      General: Lids are normal. No scleral icterus.     Extraocular Movements: Extraocular movements intact.      Right eye: No nystagmus.      Left eye: No nystagmus.      Conjunctiva/sclera: Conjunctivae normal.      Pupils: Pupils are equal, round, and reactive to light.   Neck:      Thyroid: No thyromegaly.      Vascular: No JVD.      Trachea: No tracheal deviation.   Cardiovascular:      Rate and Rhythm: Normal rate and regular rhythm.      Pulses: Normal pulses.      Heart sounds: Normal heart sounds. No murmur heard.   No friction rub. No gallop.    Pulmonary:      Effort: Pulmonary effort is normal. No respiratory distress.      Breath sounds: Normal breath sounds.   Abdominal:      General: Bowel sounds are normal. There is no distension.      Palpations: Abdomen is soft. There is no mass.      Tenderness: There is no abdominal tenderness. There is no guarding or rebound.   Musculoskeletal:         General: No tenderness. Normal range of motion.      Cervical back: Normal range of motion and neck supple. No erythema or rigidity.      Right lower leg: No edema.      Left lower leg: No edema.   Lymphadenopathy:      Cervical: No cervical adenopathy.   Skin:     General:  Skin is warm and dry.      Capillary Refill: Capillary refill takes less than 2 seconds.      Coloration: Skin is not pale.      Findings: No erythema or rash.   Neurological:      Mental Status: She is alert.      Cranial Nerves: No cranial nerve deficit.      Sensory: No sensory deficit.      Motor: Motor function is intact.   Psychiatric:         Mood and Affect: Mood normal. Affect is labile and inappropriate.         Speech: Speech is rapid and pressured and tangential.         Behavior: Behavior is agitated and hyperactive.         Thought Content: Thought content is paranoid and delusional.         Judgment: Judgment is impulsive and inappropriate.            ED Course   10:20 AM  The patient was seen and examined by Garth Lucero MD in Room ED11.      Procedures       Critical Care Addendum    My initial assessment, based on my review of prehospital provider report, focused history and physical exam, established that Mikaela Esquivel has severe agitation, which requires immediate intervention, and therefore she is critically ill.     After the initial assessment, the care team initiated medication therapy with Ativan and Benadryl, performed EKG and initiated physical restraints and Code 21 to provide stabilization care to provide stabilization care. Due to the critical nature of this patient, I reassessed nursing observations, vital signs, physical exam, review of cardiac rhythm monitor, mental status and respiratory status multiple times prior to her disposition.     Time also spent performing documentation, discussion with family to obtain medical information for decision making, reviewing test results and coordination of care.     Critical care time (excluding teaching time and procedures): 30 minutes.        EKG Interpretation:      Interpreted by Garth Lucero MD  Time reviewed:1140   Symptoms at time of EKG: agitation and prehospital Droperidol   Rhythm: Normal sinus   Rate: 95  Ectopy:  None  Conduction: Normal  ST Segments/ T Waves: No acute ischemic changes and QTc prolongation 495ms  Q Waves: None  Comparison to prior: No old EKG available    Clinical Impression: prolonged QT interval       Labs Ordered and Resulted from Time of ED Arrival Up to the Time of Departure from the ED   COMPREHENSIVE METABOLIC PANEL - Abnormal; Notable for the following components:       Result Value    Urea Nitrogen 6 (*)     All other components within normal limits   HCG QUALITATIVE URINE - Normal   TSH WITH FREE T4 REFLEX - Normal   CBC WITH PLATELETS & DIFFERENTIAL    Narrative:     The following orders were created for panel order CBC with platelets differential.  Procedure                               Abnormality         Status                     ---------                               -----------         ------                     CBC with platelets and d...[[id number]]                      Final result                 Please view results for these tests on the individual orders.   CBC WITH PLATELETS AND DIFFERENTIAL   CARDIAC CONTINUOUS MONITORING   DOCUMENT       Medications   ferrous gluconate (FERGON) tablet 324 mg (324 mg Oral Given 9/10/21 1000)   Vitamin D3 (CHOLECALCIFEROL) tablet 25 mcg (25 mcg Oral Given 9/10/21 1000)   LORazepam (ATIVAN) tablet 1 mg (has no administration in time range)     Or   LORazepam (ATIVAN) injection 1 mg (has no administration in time range)   haloperidol (HALDOL) tablet 5 mg (has no administration in time range)     Or   haloperidol lactate (HALDOL) injection 5 mg (has no administration in time range)   diphenhydrAMINE (BENADRYL) capsule 50 mg (has no administration in time range)     Or   diphenhydrAMINE (BENADRYL) injection 50 mg (has no administration in time range)   acetaminophen (TYLENOL) tablet 650 mg (has no administration in time range)   alum & mag hydroxide-simethicone (MAALOX) suspension 30 mL (has no administration in time range)   senna-docusate  (SENOKOT-S/PERICOLACE) 8.6-50 MG per tablet 1 tablet (has no administration in time range)   melatonin tablet 3 mg (has no administration in time range)   QUEtiapine (SEROquel) tablet 100 mg (100 mg Oral Given 9/10/21 2229)   QUEtiapine (SEROquel) tablet 50 mg (has no administration in time range)   QUEtiapine (SEROquel) tablet 100 mg (has no administration in time range)   OLANZapine (zyPREXA) tablet 5-10 mg (has no administration in time range)     Or   OLANZapine (zyPREXA) injection 5-10 mg (has no administration in time range)   tolterodine (DETROL) tablet 2 mg (2 mg Oral Given 9/10/21 2229)   LORazepam (ATIVAN) injection 1 mg (1 mg Intramuscular Given 9/9/21 1044)   diphenhydrAMINE (BENADRYL) injection 50 mg (50 mg Intramuscular Given 9/9/21 1044)   0.9% sodium chloride BOLUS (1,000 mLs Intravenous New Bag 9/9/21 1554)        Assessments & Plan (with Medical Decision Making)     This patient presented to the emergency department agitated, delusional and paranoid.  She did appear to be responding to internal stimuli and had been given 10 mg of droperidol restrained in route after fighting with police and EMS.  Despite this large dose of droperidol, patient was still quite agitated and required initiation of code 21 and continued restraints initially.  Twelve-lead EKG was obtained and did demonstrate some QT prolongation so further sedation was done with avoidance of QT prolonging medications.  She was given Ativan and Benadryl and agitation decreased to a point where we were able to safely remove the restraints.  The DEC  assessed the patient and spoke with the patient's family and began arranging for admission to the mental health unit.  Patient was placed on a 72-hour hold.  At this point in time I cannot identify any obvious medical issues that would preclude admission to the mental health unit.  Patient is currently awaiting bed placement.    I have reviewed the nursing notes. I have reviewed the  findings, diagnosis, plan and need for follow up with the patient.    Current Discharge Medication List          Final diagnoses:   Psychosis, unspecified psychosis type (H)     ISamantha, am serving as a trained medical scribe to document services personally performed by Garth Lucero MD, based on the provider's statements to me.      Garth BLOOM MD, was physically present and have reviewed and verified the accuracy of this note documented by Samantha Bowden.     --  Garth Lucero MD  [facility] EMERGENCY DEPARTMENT  9/9/2021     Garth Lucero MD  09/11/21 5900

## 2021-09-10 PROBLEM — F31.9 BIPOLAR 1 DISORDER (H): Status: ACTIVE | Noted: 2021-01-01

## 2021-09-10 NOTE — PLAN OF CARE
"  Problem: Sleep Disturbance (Manic or Hypomanic Signs/Symptoms)  Goal: Improved Sleep (Manic/Hypomanic Signs/Symptoms)  Outcome: Improving     Problem: Behavior Regulation Impairment (Manic or Hypomanic Signs/Symptoms)  Goal: Improved Impulse Control (Manic/Hypomanic Signs/Symptoms)  Outcome: No Change     Pt presented as alert and oriented to place and self throughout shift.  She was dressed appropriately and appeared well groomed, however she did not participate in any ADLs this shift.  Pt's speech as rapid and pressured, but was coherent and she was able to express her needs appropriately.  She ate meals and was medication compliant.  Pt did not request any PRN medications on this shift.  She denied SI/HI/SIB.  Pt denied any psychosis, and she did not appear to be responding to internal stimuli upon nursing assessment.  Pt was anxious throughout the shift, however she explained that this was due to the \"unknown\" of what was happening with her jobs and things happening with her everyday life outside of the hospital.  She appeared less anxious when she was able to contact her jobs and let them know that she would be absent from them for a few days.  Pt was seen socializing with select staff throughout the day.  She denied any acute physical health concerns, pain or side effects to medications this shift.  "

## 2021-09-10 NOTE — PLAN OF CARE
BEHAVIORAL TEAM DISCUSSION    Participants: Dr. Zoey Watt, Susan Qureshi MD (resident), Mela Walker RN, DANA- Majo Sahu LMFT, [facility]    Progress: pt is hypo-manic, somewhat pressured speech, yet calm and cooperative.    Anticipated length of stay:  1-3 days  Continued Stay Criteria/Rationale: observation of mental health symptoms   Medical/Physical: none   Precautions:   Behavioral Orders   Procedures     Assault precautions     Code 1 - Restrict to Unit     Discontinue 1:1 attendant for suicide risk     Order Specific Question:   I have performed an in person assessment of the patient     Answer:   Based on this assessment the patient no longer requires a one on one attendant at this point in time.     Routine Programming     As clinically indicated     Status 15     Every 15 minutes.     Plan: conduct initial assessment, meet with psychiatrist and treatment team. Coordinate appropriate and supportive discharge plans with the patient   Rationale for change in precautions or plan: no change

## 2021-09-10 NOTE — PHARMACY-ADMISSION MEDICATION HISTORY
Admission Medication History Completed by Pharmacy    See [facility] Admission Navigator for allergy information, preferred outpatient pharmacy, prior to admission medications and immunization status.     Medication History Sources:     Pharmacy fill history via [employer]    Changes made to PTA medication list (reason):    Added: None    Deleted: None    Changed: None    Additional Information:    Per fill history paroxetine and quetiapine were filled on 9/4/21 for 90 day supplies. Trospium filled 9/7/21 for 90 day supply. No fill history in . Pt not appropriate for interview at this time so could not confirm OTC/supplement use.    Prior to Admission medications    Medication Sig Last Dose Taking? Auth Provider   ferrous gluconate (FERGON) 324 (38 Fe) MG tablet Take 1 tablet (324 mg) by mouth daily (with breakfast) 9/8/2021 at Unknown time Yes Susan Booker NP   PARoxetine (PAXIL) 40 MG tablet Take 1 tablet (40 mg) by mouth daily 9/8/2021 at Unknown time Yes Susan Booker NP   QUEtiapine (SEROQUEL) 50 MG tablet TAKE 1 TABLET(50 MG) BY MOUTH AT BEDTIME Unknown at Unknown time Yes Susan Booker NP   trospium (SANCTURA) 20 MG tablet Take 1 tablet (20 mg) by mouth At Bedtime Unknown at Unknown time Yes Susan Booker NP   VITAMIN D PO  9/8/2021 at Unknown time Yes Reported, Patient       Date completed: 09/10/21    Medication history completed by: Becca [facility]

## 2021-09-10 NOTE — H&P
Psychiatry History and Physical    Mikaela Esquivel MRN# [id number]   Age: 41 year old YOB: 1980     Date of Admission:  9/9/2021          Assessment:   This patient is a 41 year old female with history of MARCIA and OCD who presented to ED with acute kishore in context of unknown triggers. Symptoms and presentation at this time is most consistent with Bipolar 1 disorder, current manic episode. I have discussed the risks, benefits, and alternatives of medication changes as detailed below. Patient will likely benefit from psychiatry follow-up upon discharge.      Inpatient psychiatric hospitalization is warranted at this time for safety, stabilization, and possible adjustment in medications.         Diagnoses:     Bipolar disorder type 1, current manic episode  Generalized Anxiety Disorder  Obsessive Compulsive Disorder         Plan:   Target psychiatric symptoms and interventions:  # Acute manic episode  - DISCONTINUE PTA paroxetine 40mg daily  - Increase PTA quetiapine from 50mg to 100mg at bedtime  - Add quetiapine 100mg at bedtime PRN for sleep  - Add quetiapine 50mg TID PRN for anxiety and mild agitation  - First episode psychosis workup: HIV, Treponema, Lyme, ceruloplasmin, JOSEFA, ESR, lipid profile. No need for MRI at this time (had normal MRI in November 2020 as part of workup for headaches)    acetaminophen, alum & mag hydroxide-simethicone, diphenhydrAMINE **OR** diphenhydrAMINE, haloperidol **OR** haloperidol lactate, hydrOXYzine, LORazepam **OR** LORazepam, melatonin, OLANZapine **OR** OLANZapine, QUEtiapine, QUEtiapine, senna-docusate    Risks, benefits, and alternatives discussed at length with patient.     Medical Problems and Treatments:  - No acute medical concerns    # Hx overactive bladder  - Continue PTA trospium 20mg at bedtime     # Long QT segment on EKG 9/9 (QTc 495)  - Repeat EKG 9/10  - Continue to monitor. We have discontinued paroxetine, which may help lower QTc, but are also  "increasing quetiapine, which could make it worse. Will obtain another EKG prior to discharge    Behavioral/Psychological/Social:  - Encourage unit programming    Safety:  Behavioral Orders   Procedures     Assault precautions     Code 1 - Restrict to Unit     Discontinue 1:1 attendant for suicide risk     Order Specific Question:   I have performed an in person assessment of the patient     Answer:   Based on this assessment the patient no longer requires a one on one attendant at this point in time.     Routine Programming     As clinically indicated     Status 15     Every 15 minutes.       Legal Status: 72 hour hold    Disposition Plan   Reason for ongoing admission: is unable to care for self due to severe psychosis or kishore  Discharge location: home with self-care  Discharge Medications: not ordered  Follow-up Appointments: not scheduled    Entered by: Susan Qureshi on 9/10/2021 at 1:46 PM            Chief Complaint:     \"I feel like I was manic\"         History of Present Illness:     The patient presented to  ED on 9/9/21 with delusions and disruptive behavior. While in the ED, the patient exhibited signs and symptoms of kishore, including rapid and pressured speech, irritability, and grandiose delusions. Routine labs were performed and were unremarkable. She did exhibit violent/aggressive behaviors in the ED, and did require restraints in the ED. Pt is on a 72 hour hold which was placed on Thursday 9/9 at 1559.    Upon interview with patient, she reported that she thinks she was manic yesterday but is feeling better today. She says that she was at a financial independence camp/retreat over the weekend (\"[employer]\") and was one of the people putting on the event. She had a great time and had a lot of fun with the other attendees. She had an idea while there that led to a \"lul moment\" that she later descried as \"the greatest epiphany of my life\". This happened when she was having a " "conversation at the retreat during which someone was encouraging her to think about what she wanted for her life. The idea is based on renting the apartment across from hers for her [employer] business and getting other [facility] (some of whom are real estate investors) to purchase the 9-unit apartment complex where she lives. She was excited about this idea Monday evening and was working on plans a lot. She had moments of \"overwhelming gratitude for life\" in which she became tearful while planning her ideas, but overall she was very excited and positive about her plans. She has OCD and struggles with maintaining order in her apartment, so the thought of having a second apartment to manage her work that could be allowed to get more disordered made her feel \"like I had solved my OCD\". She was trying to reach out to her friends to share the details of her idea and had apparently contacted many different people that she knows. She had a couple days of reduced need for sleep and then Wednesday night did not sleep at all because she was so excited about what she was working on. She typically takes naps most days, and says that she hasn't napped for about 5 days.    On Thursday morning, her parents came to her apartment \"out of nowhere\" and told her that she needed to go to the hospital. From her perspective, they were there for a total of about 5 minutes and she admits that she was \"worked up\" but was trying to calm herself down and says that if they had just let her open up a breathing sudha that she uses to calm herself, then she would have been able to avoid this whole hospitalization. She returns to this idea multiple times, that \"if only they had let me open the sudha without rushing me out\", then she wouldn't have to be here. She feels that they rushed her along without letting her talk, and says that they told her they had been in her apartment for about 90 minutes but she is still convinced that " "it was 5-10 minutes and that they were rushing her.    The retreat that she went to is with a group that has a podcast and puts on these financial independence retreats periodically. She says she has been to them at least 3 times. It is a community that is supportive and fun, and she denies any drug use or unusual activities at these events. She says she is content with her life and has \"never been happier\". 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     Mikaela says that she has a history of generalized anxiety and OCD, but no other diagnoses. She repeatedly denies any history of episodes similar to this. She acknowledges that this seems like a manic episode to her and that it is unusual. The only other times she has gone without sleep for a whole night is when she was in college and stayed up to study before tests; she says this was not because she didn't need sleep, just that she felt the need to study. She has had at least one episode of depression, and says that about 7 years ago she was so depressed that she felt suicidal. She doesn't think she has been depressed recently.     Mikaela denies any history of substance use. She drinks alcohol occasionally, roughly one drink every couple weeks to a month. She does not use tobacco, marijuana, stimulants, or any other substances. She doesn't like using caffeine because she doesn't want to be dependent on it, so she only drinks a caffeinated soda every once in awhile when she is feeling drowsy and needs to be awake. She does not take any supplements. She has been taking paroxetine and quetiapine for \"many years, probably more than a decade\" at their current dosages and she rarely forgets to take them.     Mikaela repeatedly says that she is feeling better and wants to go home. She is concerned about things that she has to do and wants her planner and her cell phone available. She acknowledges that she was manic and says she does remember saying things " "yesterday like \"I am God\", though has difficulty explaining her thought process. She does remember exhibiting physical aggression and requiring restraints and asks \"did they sedate me last night?\". She says that her mind has been \"feeling fuzzy\" and that she is having trouble maintaining her train of thought, though this is better than yesterday. She says over the last few days her mind was \"racing\" and it felt like \"too much\". She thinks that this was all triggered by sleep deprivation    Goals for hospitalization include: Reduced Symptoms and Monitor Psychological Status    Collateral information:  This writer called to speak with the patient's mother Lakia over the phone. Lakia says that she thinks this all started at the retreat, and they hadn't talked with Mikaela for a couple days but were woken up on Thursday morning by a friend of Mikaela who called to say she was concerned that Mikaela was manic (Mikaela had been texting the friend throughout the night to talk about her ideas). Lakia and her  went over to Mikaela's apartment and found her to be talking rapidly and with pressured speech, irritability, and high psychomotor energy. They stayed there for a full hour and a half trying to calm her down and listen to her, and eventually tried to get her to go with them to the hospital. When she wouldn't go, they called the police to help bring her to the hospital. They say she has never experienced anything like this as far as they know. When asked about possible hypomanic episodes, Lakia said that there have been some times when she gets \"on a kick\" to clean her apartment and will have increased goal-directed activity for a few weeks and then it subsides. Other than that, she can't think of anything that would fit with kishore or hypomania. She did say that Mikaela had a period of severe depression about 7 years ago when Mikaela's sister got , and that she had some disruptive behaviors at the wedding that were concerning " "for the family. She expressed suicidal ideation around this time and after the wedding she \"went missing\" and her [place] around [place] trying to find her. She also says that there have been a couple \"major crises\" during stressful periods of Mikaela's life, but has difficulty describing how this manifested.            Psychiatric Review of Systems:   Depression:   Reports: None  Denies: depressed mood, suicidal ideation, decreased interest, changes in sleep, changes in appetite, guilt, hopelessness, helplessness, impaired concentration, decreased energy, irritability.  Cecille:   Reports: sleeplessness, impulsiveness, racing thoughts, increased goal-directed activities, pressured speech, increase in energy  Denies: None  Psychosis:   Reports: paranoia, grandiosity  Denies: visual hallucinations, auditory hallucinations, ideas of reference  Anxiety:   Reports: None  Denies: worries that are difficult to control for the past 6 months, panic attacks  PTSD:   Reports: None  Denies: re-experiencing past trauma, nightmares, increased arousal, avoidance of traumatic stimuli, impaired function.  OCD:   Reports: obsessions, cleaning  Denies: checking, symmetry, skin picking.  ED:   Reports: None  Denies: restriction, binging, purging.           Medical Review of Systems:     Review of systems positive for symptoms of overactive bladder, treated with trospium. Patient denies palpitations, chest pain, shortness of breath.  10 point review of systems is otherwise negative unless noted above.            Psychiatric History:   Psychiatric Hospitalizations: None  History of Psychosis: None  Prior ECT: None  Court Commitment: None  Suicide Attempts: None  Self-injurious Behavior: None  Violence toward others: Some aggression in the ED on admission, no history of violence in the community  Use of Psychotropics: None         Substance Use History:   Alcohol: Occasional, roughly a drink or two every few " weeks  Cannabis: none  Nicotine: none  Cocaine: none  Methamphetamine: none  Opiates/Heroin: none  Benzodiazepines: none  Hallucinogens: none  Inhalants: none  Caffeine: rarely    Prior Chemical Dependency treatment: none          Social History:   Parents report patient was adopted at 4 months old.  Patient lives alone.  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  Parents report patient has a degree in education and some master's degree credits.  She was a teacher and left the profession.  Parents states it may have been too much stress.  Patient has a history of being in [employer] and student teaching abroad.      Patient reports that she has a relationship with her biological family in [place]. There is no known mental illness in the family.         Family History:     H/o completed suicides in family: None         Past Medical History:     Past Medical History:   Diagnosis Date     ASCUS favor benign 1/2013    Neg HR HPV     Cervical high risk HPV (human papillomavirus) test positive 7/2015, 01/07/19, 03/03/20    See problem list.      Depressive disorder, not elsewhere classified      Generalized anxiety disorder      Urgency of urination               Past Surgical History:     Past Surgical History:   Procedure Laterality Date     EYE SURGERY      LASIK     SURGICAL HISTORY OF -   2010    eye surgery            Allergies:    No Known Allergies           Medications:   I have reviewed this patient's current medications  Medications Prior to Admission   Medication Sig Dispense Refill Last Dose     ferrous gluconate (FERGON) 324 (38 Fe) MG tablet Take 1 tablet (324 mg) by mouth daily (with breakfast)   9/8/2021 at Unknown time     PARoxetine (PAXIL) 40 MG tablet Take 1 tablet (40 mg) by mouth daily 90 tablet PRN 9/8/2021 at Unknown time     QUEtiapine (SEROQUEL) 50 MG tablet TAKE 1 TABLET(50 MG) BY MOUTH AT BEDTIME 90 tablet PRN Unknown at Unknown time     trospium (SANCTURA) 20 MG tablet Take 1 tablet (20 mg)  by mouth At Bedtime 90 tablet 0 Unknown at Unknown time     VITAMIN D PO    9/8/2021 at Unknown time             Labs:     Recent Results (from the past 24 hour(s))   Asymptomatic COVID-19 Virus (Coronavirus) by PCR Nasopharyngeal    Collection Time: 09/09/21  3:25 PM    Specimen: Nasopharyngeal; Swab   Result Value Ref Range    SARS CoV2 PCR Negative Negative   Extra Red Top Tube    Collection Time: 09/09/21  3:51 PM   Result Value Ref Range    Hold Specimen [facility]    Comprehensive metabolic panel    Collection Time: 09/09/21  3:52 PM   Result Value Ref Range    Sodium 140 133 - 144 mmol/L    Potassium 3.4 3.4 - 5.3 mmol/L    Chloride 107 94 - 109 mmol/L    Carbon Dioxide (CO2) 27 20 - 32 mmol/L    Anion Gap 6 3 - 14 mmol/L    Urea Nitrogen 6 (L) 7 - 30 mg/dL    Creatinine 0.74 0.52 - 1.04 mg/dL    Calcium 8.8 8.5 - 10.1 mg/dL    Glucose 89 70 - 99 mg/dL    Alkaline Phosphatase 60 40 - 150 U/L    AST 35 0 - 45 U/L    ALT 25 0 - 50 U/L    Protein Total 7.5 6.8 - 8.8 g/dL    Albumin 4.1 3.4 - 5.0 g/dL    Bilirubin Total 1.2 0.2 - 1.3 mg/dL    GFR Estimate >90 >60 mL/min/1.73m2   TSH with free T4 reflex    Collection Time: 09/09/21  3:52 PM   Result Value Ref Range    TSH 2.09 0.40 - 4.00 mU/L   CBC with platelets and differential    Collection Time: 09/09/21  3:53 PM   Result Value Ref Range    WBC Count 8.4 4.0 - 11.0 10e3/uL    RBC Count 4.23 3.80 - 5.20 10e6/uL    Hemoglobin 12.6 11.7 - 15.7 g/dL    Hematocrit 37.8 35.0 - 47.0 %    MCV 89 78 - 100 fL    MCH 29.8 26.5 - 33.0 pg    MCHC 33.3 31.5 - 36.5 g/dL    RDW 12.6 10.0 - 15.0 %    Platelet Count 254 150 - 450 10e3/uL    % Neutrophils 65 %    % Lymphocytes 23 %    % Monocytes 10 %    % Eosinophils 1 %    % Basophils 1 %    % Immature Granulocytes 0 %    NRBCs per 100 WBC 0 <1 /100    Absolute Neutrophils 5.5 1.6 - 8.3 10e3/uL    Absolute Lymphocytes 1.9 0.8 - 5.3 10e3/uL    Absolute Monocytes 0.9 0.0 - 1.3 10e3/uL    Absolute Eosinophils 0.1 0.0 - 0.7 10e3/uL     "Absolute Basophils 0.0 0.0 - 0.2 10e3/uL    Absolute Immature Granulocytes 0.0 <=0.0 10e3/uL    Absolute NRBCs 0.0 10e3/uL       /79 (BP Location: Right arm)   Pulse 106   Temp 97.6  F (36.4  C) (Tympanic)   Resp 16   LMP  (LMP Unknown)   SpO2 98%   Weight is 0 lbs 0 oz. There is no height or weight on file to calculate BMI.         Psychiatric Mental Status Examination:   Appearance: awake, alert  Attitude: cooperative and pleasant  Eye Contact: good  Mood:  \"Wonderful\"  Affect: mood congruent, positive and full range  Speech:  clear, coherent. Rapid and mildly pressured. Interruptible  Language: fluent in English  Psychomotor Behavior:  no evidence of tardive dyskinesia, dystonia, or tics  Gait/Station: normal  Thought Process:  tangential, perseverative on details of what led to her hospitalization and desire to leave  Associations:  no loose associations  Thought Content:  Denying SI/HI/AVH; no evidence of psychotic thinking  Insight:  fair  Judgment: fair  Oriented to:  time, person, and place  Attention Span and Concentration:  intact, per patient report somewhat impaired  Recent and Remote Memory:  intact  Fund of Knowledge: appropriate    Clinical Global Impressions  First:  Considering your total clinical experience with this particular patient population, how severe are the patient's symptoms at this time?: 7 (9/9/2021)  Compared to the patient's condition at the START of treatment, this patient's condition is: 4 (9/9/2021)  Most recent:  Considering your total clinical experience with this particular patient population, how severe are the patient's symptoms at this time?: 7 (9/9/2021)  Compared to the patient's condition at the START of treatment, this patient's condition is: 4 (9/9/2021)         Physical Exam:   Please refer to physical exam completed by ED provider, Garth Lucero, on 9/9/21. I agree with the findings and assessment and have no additional findings to add at this time. "       Susan Qureshi MD, PhD  Psychiatry Resident, PGY-2   09/10/21

## 2021-09-10 NOTE — PLAN OF CARE
"Initial Psychosocial Assessment    I have reviewed the chart, met with the patient, and developed Care Plan.  Information for assessment was obtained from: chart review and interview with patient     Patient: interview     Presenting Problem:  Pt is a 40 yo  female who was brought to the ED by EMS last evening after her parents called 911.  They were concerned about her erratic behavior.  She was making irrational statements, manic, delusional statements like, \"I am God\" \"God is real\" \"I'm like a baby\" and \"we're all connected\" It's an \"amazing feeling to be reincarnated multiple times.\" She reported that she has an elaborate plan and enlightenment that has happened recently and she was trying to explain this plan to her parents.  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  When speaking with her today on Station 12 she is calm, cooperative and forthcoming with information.  She does have pressured and hyper-verbal speech.  But, her thought process is linear and she denies any SI, HI and SIB. She exhibits hypomania.     Her parents called the police because she was making so many erratic statements. When they tried to help her she was agitated and needed to be carried out of her apartment and treated with medications.     Parents are Lakia and Aramis ([phone]) - GINA in chart.    Per DEC assessment:   \"This weekend patient was a facilitator/host for a \"financial club\".  They have meetings around the country and this time it was in [place].  Parents received a call from woman participating this weekend with concerns that patient is manic.  Yesterday father received a phone call from patient.  Her voice was trembling and she was scared.  Patient rides her bike everywhere and she expressed concern about getting home safe.  Parents rushed to where she was and patient was crying and afraid of what would happen.  She wanted to go to the drug store for her medications.  They " "brought her back to her apartment.  Patient showed them she rented a second apartment across the [place] from her own which seemed strange to them.  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  Patient was on the phone the whole time and talking about a great plan and an epiphany she had.  She was speaking grandiosely about solving the problems of [place], mental illness, and her apartment building.  Patient reports that she had not slept at all the night before.  Parents were able to calm her some and they agree to check on her in the morning.  Patient did not answer their calls this morning or the call was disconnected.  They went to check on her and could not get in.  They ran all the entrance buzzers until they found someone to let them in.  Patient would not let them in her apartment until father told her to in a skinner manner.  Patient reported not sleeping at all again.  Patient was hyper-verbal and wanting to explain her great plan. She was distracted and rambling.  She wanted to record their interaction to post to social media.  Mother states patient was speaking random thoughts.  Patient was high energy and ramped up.  She was making erractic phone calls.  Parents called 911.  Patient became escalated/agitated.  She thew pictures, broke glass, damaged her apartment.  Patient had to be carried out kicking and screaming.\"      History of Mental Health and Chemical Dependency:  Per her Parents report to the DEC : \"Parents report patient has always had anxiety.  They describe her as worrying a lot, type A personality, perfectionist, bright, and a good athlete.  Parents report patient has been on medication for many years.  They are aware of previous ED visits for mental health concerns.  They are uncertain if she has previously been hospitalized for mental daily. They reports she has experienced bouts of anxiety and depression over the years.  She also has OCD.  Parents state she has done " "well the past several years and hoped that she aged out of some of her struggles.\"    Family Description (Constellation, Family Psychiatric History):  Pt was adopted at 4 months old, unknown family hx.     Hx of depression, anxiety, and OCD. Denies any substance abuse and leads a very healthy lifestyle.        Parents are unaware of any hospitalizations.  They report knowing patient has been to the ED a few times for mental health related concerns.    Pt is prescribed the following:   ferrous gluconate (FERGON) 324 (38 Fe) MG tablet  PARoxetine (PAXIL) 40 MG tablet  QUEtiapine (SEROQUEL) 50 MG tablet  trospium (SANCTURA) 20 MG tablet  VITAMIN D PO    Significant Life Events (Illness, Abuse, Trauma, Death):  None noted    Living Situation:  Patient lives alone.      Educational Background:  Parents report patient has a degree in education and some master's degree credits.  She was a teacher and left the profession.     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   Parents states it may have been too much stress.  Patient has a history of being in [employer] and student teaching abroad.      Financial Status:  Financially stable  She has [employer] INDIVIDUAL FAMILY PLANS    Legal Issues:  None noted    Ethnic/Cultural Issues:  None noted    Spiritual Orientation:  None noted     Service History:  None noted    Social Functioning (organization, interests):  Eats healthy, works out, makes jewelry     Current Treatment Providers are:  - Primary Care Physician   Susan Booker, [facility]  [address] [place] [address]    -Therapist  [facility]  [address] [place] [id number]  [email or web address]  Phone: [phone]    -Psychiatry  Richy Obrien MD,   [facility] [place] [address]  [email or web address]  Phone: [phone]    Social Service Assessment/Plan:  Patient will have psychiatric assessment and medication management by the " psychiatrist. Medications will be reviewed and adjusted per MD as indicated. The treatment team will continue to assess and stabilize the patient's mental health symptoms with the use of medications and therapeutic programming. Hospital staff will provide a safe environment and a therapeutic milieu. Staff will continue to assess patient as needed. Patient will participate in unit groups and activities. Patient will receive individual and group support on the unit.     CTC will do individual inpatient treatment planning and after care planning. CTC will discuss options for increasing community supports with the patient. CTC will coordinate with outpatient providers and will place referrals to ensure appropriate follow up care is in place.

## 2021-09-10 NOTE — PLAN OF CARE
"Personal Plan of Care    Reasons you are in the Hospital  \"I just wanted to explain my big plan and my parents called the police\"  2.\" I wish they would have just listened to me.\"   3.  4.    Goals for Discharge   Discharge as soon as possible  2. Keep up with medications  3.     Illness Management Recovery model: Personal Plan of Care    Patient completed Personal Plan of Care, identifying reasons for hospitalization and goals for discharge.   "

## 2021-09-10 NOTE — PLAN OF CARE
Problem: Behavior Regulation Impairment (Manic or Hypomanic Signs/Symptoms)  Goal: Improved Impulse Control (Manic/Hypomanic Signs/Symptoms)  Outcome: No Change    RN Admission Note:    Per report, patient was bib EMS to [facility] ED for disruptive behaviors.  She arrived  cuffed .  Her parents had called 911 and she struggled with police.  Patient was making delusional statements about being God, etc.  She had not slept for 2 days.  Patient was agitated in the ED and was throwing items. She required IM medications (Ativan and Benadryl) and restraints.  Patient later calmed.  She was hypotensive and received IV fluids.  She was medically cleared.  Placed on a 72-hour hold.       Patient arrived to Station 12 at approx 1700.  She appeared sedated.  She completed the clothing search and returned to her room to rest.  During the interview she was resting on her bed, with her eyes closed and facemask on.  She was AOx3 - when asked who the president was, she gave a mumbled answer.  When asked about AH and VH she denied them.  When asked if she was God, she replied  No, but I thought I was earlier .  She contracted to be safe on the unit.  She then asked to rest.  VSS.     Patient s parents (Lakia and Aramis) had arrived to the unit from the ED with the patient.  The patient gave writer verbal permission to speak with them.  They stated patient had been receiving free mental health services through their [place] years earlier, with a Dr. Bourgeois.  Did not know the name of current providers.  Stated patient has always had  anxiety issues .  To their knowledge the most recent medication change was to Paxil  years ago .  States patient had friends visit from [place] this weekend for a  financial money group .  Patient reported to her parents she had not  napped in a week  (typically naps daily) and  had not slept in two days .   She called her parents in a  panic attack , while volunteering at the  [employer]  because she was   paranoid that someone would attack her while she biked home (note patient is of [place] descent) . She was making delusional statements of  I am going to fix the entire city  and  I am going solve mental health in the world .     Parents described patient at baseline as a  health nut .  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  Described as an  athlete  and she is involved in many team sports (Disc golf).  She was a special Olympics [employer] .   No known history of mental illness in the family - she was adopted from [place] at a young age.  Is still in contract with family in [place].  Per parents, she drinks a  glass of wine occasionally  and no other history of chemical use.     Patient was given her patient bill of rights.  She was given a tour of the unit.  She had no questions for staff.  She slept for the rest of the shift, only waking at approx 2045 asking for a sleep aid.

## 2021-09-11 NOTE — PLAN OF CARE
"RN Assessment  Patient visible in the milieu. Presents with manic/hypomanic behaviors and in labile mood. Speech pressured. Emotional during her interactions with others including visitors. Reported feeling \"frustrated, angry, mad!\"  Preoccupied with need to \"get outside and get fresh air and ride my bike\". States, \"My parents are totally over reacted, if they would just let me open that sudha, I would be in control!\"   Inquired about possibility of leaving today into a care of her parents. Patient was explained 72 hold, with which she verbalized frustration.  Made multiple lists and schedules for self for each day. Requested access to e-mail so she \"can stay more productive while I am on this unit\"  Has made multiple phone calls and visiting arrangements. [ should be noted that she was was told in error that she could have more than one visitor per day] Had a 30 min visit from one of her friends [ No GINA on file, therefore no patient related health information was provided.] who reported as she exited that patient made numerous phone calls to friends and family which reported to be upsetting to both parties including patient asking if patient 's calls can be restricted.   Denies thoughts death/dying or not being alive. Denies thoughts of suicide or non -suicidal self injury.  However stated, \" if do not get out of here on Monday, this may change\"   No physical health concerns. /89 (BP Location: Right arm)   Pulse 101   Temp 97.8  F (36.6  C) (Tympanic)   Resp 16   LMP  (LMP Unknown)   SpO2 96%    Encouraged relaxation and healthy coping. Engaged in therapeutic listening. Care plan was reviewed and updated today.  Will continue with current plan and recommendations. Continue to monitor and reassess symptoms. Continue with current level of observation and precautions in place. See  notes for discharge planning.    "

## 2021-09-11 NOTE — PLAN OF CARE
Pt briefly attended the Therapeutic Recreation group this evening. Pt chose not to participate in the group activity, leaving after a few minutes. Pt rejoined the area, meticulously writing on many different papers, making a list of things she had to do throughout the week.

## 2021-09-11 NOTE — PLAN OF CARE
"  Problem: Behavior Regulation Impairment (Manic or Hypomanic Signs/Symptoms)  Goal: Improved Impulse Control (Manic/Hypomanic Signs/Symptoms)  Outcome: No Change    Pt is slightly manic and paranoid this evening. Rapid and pressured speech. She is alert and oriented but has poor insight into her current mental health symptoms. She spent hours in the [facility] area writing a note to her parents. She was very talkative when her parents visited.    Pt approached the medication window, asking, \"what time did I get here yesterday? What time did I slept last night, and for how many hours? What time did I eat breakfast this morning?\".  she looked so tense, and her speech was very rapid and pressured. She was trying to write all the information down.     She is worried about her 72 hours hold and asked if she could talk with the doctor tomorrow regarding discharge. It took several encouragements and attempts before she took her scheduled HS medication. She denied all mental health symptoms and refused nursing assessments.     "

## 2021-09-11 NOTE — PLAN OF CARE
Problem: Sleep Disturbance (Manic or Hypomanic Signs/Symptoms)  Goal: Improved Sleep (Manic/Hypomanic Signs/Symptoms)  Outcome: Improving   Pt slept for 7hrs thus far. Breathing pattern WNL. The 15 mins safety checks cont. No sign of pain or discomfort. Pt is on assault precaution with no related event.

## 2021-09-12 NOTE — PLAN OF CARE
Problem: Behavior Regulation Impairment (Manic or Hypomanic Signs/Symptoms)  Goal: Improved Impulse Control (Manic/Hypomanic Signs/Symptoms)  Outcome: No Change  Pt is visible in the milieu and appears slightly manic. She spent most of this evening on the phone and at the nursing station, asking to have access to her phone. She requests to have her phone at a particular time because she wants to change her voice mail message. Frequent mood swings and irregular emotional reactions were observed, especially when talking about her frustration with her stay in the hospital. She asked several times to be allowed off the unit for 15 minutes for fresh air.  Her speech is pressured as she expresses her frustration.      She took scheduled medication without any issue. Her appetite is good, and she is drinking well. Personal hygiene is good. No SI/SIB. Pt vital signs are stable.      Pt's parents visit during the day shift. They called this evening and wanted to visit again. They are told about the new [facility] policy limited to one visitor per patient/day for 30 mins. Parents were not happy about this policy.

## 2021-09-12 NOTE — PLAN OF CARE
"RN Assessment   Slight improvement from last shift. However still presents emotionally labile, with requests that cannot be fulfilled [ cell phone for sudha usage]. Had a family visit, which was uneventful. Endorsed anxiety related to her stay on the unit and frustration with current situation and lack of structured activities on the unit and coping tools that she can utilize. Spent time outlining trajectory of her illness on the wall via mini poster boards [ letter sized paper]. Focused on returning to pre-hospitalization activities and productivity. 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   No physcical health concenrn today. Vs this shift.  /74   Pulse 83   Temp 97.8  F (36.6  C) (Oral)   Resp 16   Wt 55.8 kg (123 lb)   LMP  (LMP Unknown)   SpO2 100%   BMI 21.79 kg/m     Care plan was reviewed and updated today. Will continue with current plan and recommendations. Continue to monitor and reassess symptoms. Continue with current level of observation and precautions in place. See  notes for discharge planning.    "

## 2021-09-12 NOTE — PROVIDER NOTIFICATION
09/12/21 0615   Sleep/Rest/Relaxation   Sleep/Rest/Relaxation (WDL) WDL   Sleep/Rest/Relaxation appears asleep;no problem identified   Night Time # Hours 7 hours   Patient noted as sleeping peacefully through the night with no complaints and no PRN medication requested.

## 2021-09-13 PROBLEM — G47.00 INSOMNIA: Status: ACTIVE | Noted: 2021-01-01

## 2021-09-13 NOTE — PLAN OF CARE
Problem: Sleep Disturbance (Manic or Hypomanic Signs/Symptoms)  Goal: Improved Sleep (Manic/Hypomanic Signs/Symptoms)  Outcome: No Change   Night Shift Summary (9/12/21 into 09/13/21)    Pt asleep  at start of shift. Breathing quiet and unlabored.     Pt had no c/o pain or discomfort during the HS.     Appears to have slept 6.5 hours.      Pt on  ASSAULT precautions in addition to single room order. Any related events noted above.     Will continue to monitor and assess.

## 2021-09-13 NOTE — PLAN OF CARE
Patient has had pressured, tangential speech and endorses anxiety related to her current situation. She expresses frustration with hospitalization and is eager for discharge.  She is medication compliant.  She denies depression, SI, HI, SIB, and hallucinations.      Patient discharging home today with outpatient support.  Plan is to be picked up by parents at 1500.  AVS and medications reviewed with patient and all questions answered.  Lian Helm RN

## 2021-09-13 NOTE — PLAN OF CARE
Assessment/Intervention/Current Symtoms and Care Coordination  - Chart review  - Team Meeting     Writer spoke to Rosario Aponte from outpatient programming. Explained pt's current symptoms (they were concerned about if she was exhibiting psychosis and was appropriate for their programming).  Writer explained that she is exhibiting hypomania with pressured speech and hyperverbal, but is not showing psychosis.  She is high functioning otherwise and would be a good fit for PHP.      Writer left vm and email for Arnel Ribera [facility] to schedule her OP psychiatry and therapy appt.  Waiting for return call to schedule.   Arnel [facility] called back and they cannot schedule her until next month, however. Dr. Obrien works out of [facility].  They can see her tomorrow, but she would have to pay cash as he does not accept insurance.  She was fine with that and wanted to schedule.  See below.  Also provided a letter for work.     Her parents will be picking her up.     Mental Health Follow-Up:  - Psychiatry Follow-up care:  [facility]  Appointment: Tuesday, October 14th @ 12pm w Dr. Obrien (this will be by zoom)  This appointment will be via Zoom and they will email you directions to [email or web address]  Dr Obrien does not take insurance so this is an out of pocket expense which you agreed too and wanted to set up    - Individual Therapy Follow-up  [facility] ([place] office)  This will be via Tele-Health  Appointment: Wednesday, September 15th @ 1pm with Majo Ragsdale  [facility], [address]  [place] [address]  Phone: [phone]  Fax: [phone]  [facility] TO FAX AVS     - Partial Hospitalization Program is recommended.  [facility] - (program is currently virtual)   Intake Appointment:  Tuesday, September 21st @ 8am w/ Rosario Aponte (this will be a 90 minute video appt)  The email they will send you information at is:  [email or web address]  [facility]   Located in [facility] Floor [address]   [address] [facility] [place] [address]    if questions about scheduling contact out patient intake dept @ : [phone]    Discharge Plan or Goal  Pending stabilization & development of a safe discharge plan.  Considerations include: Return home with outpatient providers    Barriers to Discharge  Patient requires further psychiatric stabilization due to current symptomology    Referral Status  Psychiatry, Therapy and PHP    Legal Status  Patient on a 72-hour hold - she is currently on a 72 hr hold.  We asked for her to sign in and stay for a few more days, but she is not interested in doing this.  Likely discharge AMA

## 2021-09-13 NOTE — DISCHARGE INSTRUCTIONS
Behavioral Discharge Planning and Instructions      Summary:  You were admitted on 9/9/2021  due to Disorganized Thinking/Behaviors, Delusional Thoughts and Manic Symptomology.  You were treated by Dr. Zoey Watt MD and discharged on 9/13/2021 from [facility].  Primary Diagnoses:   Bipolar disorder type 1, current manic episode  Generalized Anxiety Disorder  Obsessive Compulsive Disorder    Mental Health Follow-Up:  - Psychiatry Follow-up care:  [facility]  Appointment: Tuesday, September 14th @ 12pm w Dr. Obrien   This appointment will be via Zoom and they will email you directions to [email or web address]  Dr Obrien does not take insurance so this is an out of pocket expense which you agreed too and wanted to set up    - Individual Therapy Follow-up  [facility] ([facility])  This will be via Tele-Health  Appointment: Wednesday, September 15th @ 1pm with Majo Ragsdale  [facility], [address]  [place] [address]  Phone: [phone]  Fax: [phone]  [facility] TO FAX AVS     - Partial Hospitalization Program is recommended.  [facility] - (program is currently virtual)   Intake Appointment:  Tuesday, September 21st @ 8am w/ Rosario Aponte (this will be a 90 minute video appt)  The email they will send you information at is: [email or web address]  [facility]   Located in [address] [address]    if questions about scheduling contact out patient intake dept @ : [phone]    Attend all scheduled appointments with your outpatient providers. Call at least 24 hours in advance if you need to reschedule an appointment to ensure continued access to your outpatient providers.   Major Treatments, Procedures and Findings:  You were provided with: a psychiatric assessment, medication evaluation and/or management, group therapy and milieu  "management    Symptoms to Report: feeling more aggressive, increased confusion, losing more sleep, mood getting worse or thoughts of suicide    Early warning signs can include: increased depression or anxiety sleep disturbances increased thoughts or behaviors of suicide or self-harm  increased unusual thinking, such as paranoia or hearing voices    Safety and Wellness:  Take all medicines as directed.  Make no changes unless your doctor suggests them.      Follow treatment recommendations.  Refrain from alcohol and non-prescribed drugs.  Ask your support system to help you reduce your access to items that could harm yourself or others. Items could include:  Firearms  Medicines (both prescribed and over-the-counter)  Knives and other sharp objects  Ropes and like materials  Car keys  If there is a concern for safety, call 911. If there is a concern for safety, call 911.    Resources:   Crisis Intervention: 829.809.5273 or 684-858-5064 (TTY: 944.282.8422).  Call anytime for help.  National [place] on Mental Illness (www.[place].vicente.org): 606.864.1225 or 477-087-4622.  [place] Association for Children's Mental Health (www.macmh.org): 457.721.8493.  Alcoholics Anonymous (www.alcoholics-anonymous.org): Check your phone book for your local chapter.  Suicide Awareness Voices of Education (SAVE) (www.save.org): 740-729-PBGI (2544)  National Suicide Prevention Line (www.mentalhealthmn.org): 123-081-ZDRL (9397)  Mental Health Consumer/Survivor Network of [place] (www.mhcsn.net): 874.325.5916 or 137-894-3991  Mental Health Association of [place] (www.mentalhealth.org): 571.593.4352 or 706-267-0374  Self- Management and Recovery Training., SMART-- Toll free: 158.782.8180  www.[employer].org  [facility] Crisis (COPE) Response - Adult 719 719-4212  [facility] Crisis Response - Adult 561 118-4029  Text 4 Life: txt \"LIFE\" to 66336 for immediate support and crisis intervention  Crisis text line: Text \"[place]\" to 336723. Free, confidential, " 24/7.  Crisis Intervention: 843.388.2960 or 990-392-3735. Call anytime for help.   [facility] Mental Health Crisis Team - Child: 117.235.1513  [facility]'s Mental Health Crisis Response Team - Child: [phone]    The treatment team has appreciated the opportunity to work with you. If you have any questions or concerns our unit number is [phone].

## 2021-09-13 NOTE — CONSULTS
Inpatient Diagnostic Assessment order acknowledged.  LVM for CTC on this date to discuss current level of functioning.   also stated that patient may need to be scheduled for an outpatient assessment due to lack of provider availability.    Spoke with CTC who stated patient may discharge on this date.   provided instructions on how to schedule an outpatient assessment.  If patient remains inpatient and is transferred to a less acute unit, a new consult may be placed and patient can be seen based on provider availability.    Consult closed.    Rosario Recio, [facility]

## 2021-09-13 NOTE — DISCHARGE SUMMARY
"Psychiatric Discharge Summary    Mikaela Esquivel MRN# [id number]   Age: 41 year old YOB: 1980     Date of Admission:  9/9/2021  Date of Discharge:  9/13/2021  2:58 PM  Admitting Physician:  Zoey Watt MD  Discharge Physician:  Zoey Watt MD         Events Leading to Hospitalization:   The patient presented to  ED on 9/9/21 with delusions and disruptive behavior. While in the ED, the patient exhibited signs and symptoms of kishore, including rapid and pressured speech, irritability, and grandiose delusions. Routine labs were performed and were unremarkable. She did exhibit violent/aggressive behaviors in the ED, and did require restraints in the ED. Pt is on a 72 hour hold which was placed on Thursday 9/9 at 1559.    ED notes include: Per EMS, patient was rambling on and not answering questions. They state they had to carry her out as she was struggling. Patient was manic, irrational, and breaking things in the apartment. Patient was given droperidol 10 mg around 10 am by EMS. Here in the ED, patient states \"I am God\", \"God is real\", and \"I'm like a baby.\" She continues to repeat \"I really want to win this one.\" Patient also states it is an \"amazing feeling to be reincarnated multiple times.\" She states \"I'm so glad I see the light\" and \"we're all connected, beam me up ava.\" Patient believes the year is 1987. She denies SI.     Upon interview with patient, she reported that she thinks she was manic yesterday but is feeling better today. She had an idea while there that led to a \"lul moment\" that she later descried as \"the greatest epiphany of my life\". She had a couple days of reduced need for sleep and then Wednesday night did not sleep at all because she was so excited about what she was working on. She typically takes naps most days, and says that she hasn't napped for about 5 days.     Mikaela says that she has a history of generalized anxiety and OCD, but no other " "diagnoses. She repeatedly denies any history of episodes similar to this. She acknowledges that this seems like a manic episode to her and that it is unusual. The only other times she has gone without sleep for a whole night is when she was in college and stayed up to study before tests; she says this was not because she didn't need sleep, just that she felt the need to study. She has had at least one episode of depression, and says that about 7 years ago she was so depressed that she felt suicidal. She doesn't think she has been depressed recently.      Mikaela denies any history of substance use. She drinks alcohol occasionally, roughly one drink every couple weeks to a month. She does not use tobacco, marijuana, stimulants, or any other substances. She doesn't like using caffeine because she doesn't want to be dependent on it, so she only drinks a caffeinated soda every once in awhile when she is feeling drowsy and needs to be awake. She does not take any supplements. She has been taking paroxetine and quetiapine for \"many years, probably more than a decade\" at their current dosages and she rarely forgets to take them.       See Admission note by Zoey Watt MD on 9/9/2021 for additional details.          Diagnoses:     Bipolar disorder type 1, current manic episode (new diagnosis)  Generalized Anxiety Disorder  Obsessive Compulsive Disorder         Consults:   No consultations were requested during this admission         Hospital Course:   Mikaela Esquivel was admitted to [facility] with attending Zoey Watt MD on a 72 hour mental health hold. The patient was placed under status 15 (15 minute checks) to ensure patient safety.     Psychiatric Course:   1. Medication Trials and Changes: Increased PTA quetiapine from 50mg at bedtime to 100mg at bedtime. Discontinued paroxetine. Added quetiapine 100-200mg at bedtime PRN for sleep, olanzapine 10mg PRN for sleep, and melatonin 3mg PRN for sleep.  2. " Medication adherence: adherent  3. Change in psychiatric symptoms: Over the course of this hospitalization the patient's symptoms of kishore improved. Specifically, she exhibited more reality-based thinking, improved in sleep, and became less pressured and rapid in her speech patterns.  4. Behaviors and group Attendance: The patient did not require chemical/physical restraints during admission. The patient was safe and appropriate. She was cooperative with cares and had good group attendance.   5. Legal Status: 72-hour hold    Medical Course: Mikaela Esquivel was medically cleared by the ED prior to admission to the unit. PTA medications were continued including trospium 20mg hs for overactive bladder, iron and vitamin d supplements. Admission labs were notable for the following:  - CMP normal  - CBC normal  - TSH normal  - EKG with normal sinus rhythm, right axis deviation. QTc prolonged to 495 on 9/9, improved to 462 on 9/10/21  - UDS Negative for all tested substances  - COVID negative  First-episode psychosis workup:  - Ceruloplasmin normal  - Treponema, Lyme, HIV all non-reactive  - MRI deferred as patient had normal MRI in 11/2020  - ESR normal  - JOSEFA positive. This resulted after the patient was discharged. Patient was called and informed of the results and encouraged to follow up with PCP for further workup      RISK ASSESSMENT:  Today Mikaela Esquivel denies SI, SIB, and HI. No overt evidence of psychosis observed, some residual hypomanic symptoms still present but improving. Patient grossly appears to be cognitively intact and is high-functioning at baseline. Patient has not exhibited any aggressive or violent behaviors since she was in the ED (9/9/21). She has notable risk factors for self-harm including recent psych inpt stay, lives alone/ isolated, single status, and new diagnosis of bipolar disorder.  However, risk is mitigated by no h/o suicide attempt, no plan or intent, no access to lethal means,  describes a safety plan, h/o seeking help when needed, symptom improvement, future oriented, feeling hopeful, none to minimal alcohol use  and commitment to family. Patient does not have access to firearms. Based on all available evidence she does not appear to be at imminent risk for self-harm therefore does not meet criteria for a 72-hr hold/ involuntary hospitalization.  However, based on degree of symptoms day treatment and close psych FU were recommended which the pt did agree to (PHP assessment scheduled for next Tuesday 9/21). Patient also agreed to call 911/present to ED if any imminent safety concerns arise, including re-emergence of SI. Patient was provided with crisis resources at the time of discharge. Patient agreed to further reduce risk of self-harm by completely abstaining from illicit substances and alcohol, and agreed to remain medication adherent. Expressed understanding of the risks associated with excessive alcohol use, illicit substance use, and medication/treatment non-adherence, including increased risk of harm to self or others.     Reasoning for discharge at this time: the patient demonstrated significant improvement in her manic symptoms over the course of her hospitalization, though she continued to have some residual hypomanic symptoms. She reported significant difficulty sleeping while in the hospital due to uncomfortable bedding, loud noises, and unfamiliar location. 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 Additionally, we felt that the adversarial nature of continued  involuntary hospitalization was likely to result in her being less likely to seek help in the future if needed. As the patient no longer demonstrated any imminent risks for harm to herself or others, she would not meet requirements for commitment. Voluntary hospitalization and transfer to a less-restrictive unit was offered was offered and she declined. She was amenable to close follow-up including evaluation for the partial hospitalization program and we were reassured by her baseline high level of function and her family's assurance that they would help support her in her recovery.    On the day of discharge, the patient's parents were called (Lakia and Aramis) and they confirmed that the plan was for her to stay with them for a few weeks. They did not express any immediate safety concerns and were comfortable with discharge.    Of note, we advised the patient on the day of discharge not to work at her job as a nanny until after completing the Partial Hospitalization Program and with approval of her outpatient psychiatrist, given residual hypomanic symptoms.         Discharge Medications:        Review of your medicines      START taking      Dose / Directions   melatonin 3 MG tablet  Used for: Insomnia due to other mental disorder      Dose: 3 mg  Take 1 tablet (3 mg) by mouth At Bedtime  Quantity: 30 tablet  Refills: 0     OLANZapine 10 MG tablet  Commonly known as: zyPREXA  Used for: Bipolar 1 disorder (H)      Dose: 10 mg  Take 1 tablet (10 mg) by mouth nightly as needed (To help with sleep and agitation (use second after seroquel PRN))  Quantity: 30 tablet  Refills: 0        CONTINUE these medicines which may have CHANGED, or have new prescriptions. If we are uncertain of the size of tablets/capsules you have at home, strength may be listed as something that might have changed.      Dose / Directions   * QUEtiapine 100 MG tablet  Commonly known as: SEROquel  This may have changed:     medication strength    how  "much to take    how to take this    when to take this    additional instructions  Used for: Bipolar 1 disorder (H)      Dose: 100 mg  Take 1 tablet (100 mg) by mouth At Bedtime  Quantity: 30 tablet  Refills: 0     * QUEtiapine 100 MG tablet  Commonly known as: SEROquel  This may have changed: You were already taking a medication with the same name, and this prescription was added. Make sure you understand how and when to take each.  Used for: Bipolar 1 disorder (H)      Dose: 100-200 mg  Take 1-2 tablets (100-200 mg) by mouth nightly as needed (For sleep (use first before olanzapine/zyprexa))  Quantity: 40 tablet  Refills: 0         * This list has 2 medication(s) that are the same as other medications prescribed for you. Read the directions carefully, and ask your doctor or other care provider to review them with you.            CONTINUE these medicines which have NOT CHANGED      Dose / Directions   ferrous gluconate 324 (38 Fe) MG tablet  Commonly known as: FERGON      Dose: 324 mg  Take 1 tablet (324 mg) by mouth daily (with breakfast)  Quantity:    Refills: 0     trospium 20 MG tablet  Commonly known as: SANCTURA  Used for: Overactive bladder      Dose: 20 mg  Take 1 tablet (20 mg) by mouth At Bedtime  Quantity: 90 tablet  Refills: 0     VITAMIN D PO      Refills: 0        STOP taking    PARoxetine 40 MG tablet  Commonly known as: PAXIL              Where to get your medicines      These medications were sent to [facility] [place], [place] - [address]  [address] [address]    Phone: [phone]     melatonin 3 MG tablet    OLANZapine 10 MG tablet    QUEtiapine 100 MG tablet    QUEtiapine 100 MG tablet              Psychiatric Examination:   Appearance: awake, alert  Attitude: cooperative and pleasant  Eye Contact: good  Mood:  \"calm\"  Affect: mood congruent, positive and full range  Speech:  clear, coherent. Still somewhat hyperverbal but less pressured and rapid " than previously  Language: fluent in English  Psychomotor Behavior:  no evidence of tardive dyskinesia, dystonia, or tics  Gait/Station: normal  Thought Process:  linear and logical  Associations:  no loose associations  Thought Content:  Denying SI/HI/AVH; no evidence of psychotic thinking  Insight:  fair/good  Judgment: fair/good  Oriented to:  time, person, and place  Attention Span and Concentration:  intact  Recent and Remote Memory:  intact  Fund of Knowledge: appropriate         Discharge Plan:   Mental Health Follow-Up:  - Psychiatry Follow-up care:  [facility]  Appointment: Tuesday, September 14th @ 12pm w Dr. Obrien   This appointment will be via Zoom and they will email you directions to [email or web address]  Dr Obrien does not take insurance so this is an out of pocket expense which you agreed too and wanted to set up  - Individual Therapy Follow-up  [facility] ([place] office)  This will be via Tele-Health  Appointment: Wednesday, September 15th @ 1pm with Majo Ragsdale  [facility], [address]  [place] [address]  Phone: [phone]  Fax: [phone]  [facility]UC TO FAX AVS      - Partial Hospitalization Program is recommended.  [facility] - (program is currently virtual)   Intake Appointment:  Tuesday, September 21st @ 8am w/ Rosario Aponte (this will be a 90 minute video appt)  The email they will send you information at is: [email or web address]  [facility]   Located in [address] [place] [address]    if questions about scheduling contact out patient intake dept @ : [phone]     Attend all scheduled appointments with your outpatient providers. Call at least 24 hours in advance if you need to reschedule an appointment to ensure continued access to your outpatient providers.   Major Treatments, Procedures and Findings:  You were provided with: a  "psychiatric assessment, medication evaluation and/or management, group therapy and milieu management     Symptoms to Report: feeling more aggressive, increased confusion, losing more sleep, mood getting worse or thoughts of suicide     Early warning signs can include: increased depression or anxiety sleep disturbances increased thoughts or behaviors of suicide or self-harm  increased unusual thinking, such as paranoia or hearing voices     Safety and Wellness:  Take all medicines as directed.  Make no changes unless your doctor suggests them.      Follow treatment recommendations.  Refrain from alcohol and non-prescribed drugs.  Ask your support system to help you reduce your access to items that could harm yourself or others. Items could include:  Firearms  Medicines (both prescribed and over-the-counter)  Knives and other sharp objects  Ropes and like materials  Car keys  If there is a concern for safety, call 911. If there is a concern for safety, call 911.     Resources:   Crisis Intervention: 188.344.4411 or 364-245-2564 (TTY: 546.515.6662).  Call anytime for help.  National [place] on Mental Illness (www.[place].vicente.org): 811.722.1123 or 894-418-0540.  [place] Association for Children's Mental Health (www.macmh.org): 764.965.5971.  Alcoholics Anonymous (www.alcoholics-anonymous.org): Check your phone book for your local chapter.  Suicide Awareness Voices of Education (SAVE) (www.save.org): 272-667-VFAL (1594)  National Suicide Prevention Line (www.mentalhealthmn.org): 709-658-AQAR (3322)  Mental Health Consumer/Survivor Network of [place] (www.mhcsn.net): 938.410.2618 or 845-964-2978  Mental Health Association of [place] (www.mentalhealth.org): 606.117.3897 or 845-599-2354  Self- Management and Recovery Training., SMART-- Toll free: 466.448.1085  www.Consumer Health Advisers.PermissionTV  [facility] Crisis (COPE) Response - Adult 803 862-2093  [facility] Crisis Response - Adult 670 858-7773  Text 4 Life: txt \"LIFE\" to 63489 for immediate " "support and crisis intervention  Crisis text line: Text \"[place]\" to 757190. Free, confidential, 24/7.  Crisis Intervention: 291.103.5690 or 950-451-1557. Call anytime for help.   [facility] Mental Health Crisis Team - Child: [phone]  [facility] Mental Health Crisis Response Team - Child: [phone]     The treatment team has appreciated the opportunity to work with you. If you have any questions or concerns our unit number is [phone].    Susan Qureshi MD, PhD  Psychiatry Resident, PGY-2   09/14/21      Attestation:            Appendix A: All Labs This Admission:     Results for orders placed or performed during the hospital encounter of 09/09/21   HCG qualitative urine     Status: Normal   Result Value Ref Range    hCG Urine Qualitative Negative Negative   Drug abuse screen 1 urine (ED)     Status: Normal   Result Value Ref Range    Amphetamines Urine Screen Negative Screen Negative    Barbiturates Urine Screen Negative Screen Negative    Benzodiazepines Urine Screen Negative Screen Negative    Cannabinoids Urine Screen Negative Screen Negative    Cocaine Urine Screen Negative Screen Negative    Opiates Urine Screen Negative Screen Negative   Comprehensive metabolic panel     Status: Abnormal   Result Value Ref Range    Sodium 140 133 - 144 mmol/L    Potassium 3.4 3.4 - 5.3 mmol/L    Chloride 107 94 - 109 mmol/L    Carbon Dioxide (CO2) 27 20 - 32 mmol/L    Anion Gap 6 3 - 14 mmol/L    Urea Nitrogen 6 (L) 7 - 30 mg/dL    Creatinine 0.74 0.52 - 1.04 mg/dL    Calcium 8.8 8.5 - 10.1 mg/dL    Glucose 89 70 - 99 mg/dL    Alkaline Phosphatase 60 40 - 150 U/L    AST 35 0 - 45 U/L    ALT 25 0 - 50 U/L    Protein Total 7.5 6.8 - 8.8 g/dL    Albumin 4.1 3.4 - 5.0 g/dL    Bilirubin Total 1.2 0.2 - 1.3 mg/dL    GFR Estimate >90 >60 mL/min/1.73m2   TSH with free T4 reflex     Status: Normal   Result Value Ref Range    TSH 2.09 0.40 - 4.00 mU/L   Asymptomatic COVID-19 Virus " (Coronavirus) by PCR Nasopharyngeal     Status: Normal    Specimen: Nasopharyngeal; Swab   Result Value Ref Range    SARS CoV2 PCR Negative Negative    Narrative    Testing was performed using the Xpert Xpress SARS-CoV-2 Assay on the  Cepheid Gene-Xpert Instrument Systems. Additional information about  this Emergency Use Authorization (EUA) assay can be found via the Lab  Guide. This test should be ordered for the detection of SARS-CoV-2 in  individuals who meet SARS-CoV-2 clinical and/or epidemiological  criteria. Test performance is unknown in asymptomatic patients. This  test is for in vitro diagnostic use under the FDA EUA for  laboratories certified under CLIA to perform high complexity testing.  This test has not been FDA cleared or approved. A negative result  does not rule out the presence of PCR inhibitors in the specimen or  target RNA in concentration below the limit of detection for the  assay. The possibility of a false negative should be considered if  the patient's recent exposure or clinical presentation suggests  COVID-19. This test was validated by the [facility]. This laboratory is certified under  the Clinical Laboratory Improvement Amendments of 1988 (CLIA-88) as  qualified to perform high complexity laboratory testing.     CBC with platelets differential     Status: None    Narrative    The following orders were created for panel order CBC with platelets differential.  Procedure                               Abnormality         Status                     ---------                               -----------         ------                     CBC with platelets and d...[[id number]]                      Final result                 Please view results for these tests on the individual orders.   CBC with platelets and differential     Status: None   Result Value Ref Range    WBC Count 8.4 4.0 - 11.0 10e3/uL    RBC Count 4.23 3.80 - 5.20 10e6/uL     Hemoglobin 12.6 11.7 - 15.7 g/dL    Hematocrit 37.8 35.0 - 47.0 %    MCV 89 78 - 100 fL    MCH 29.8 26.5 - 33.0 pg    MCHC 33.3 31.5 - 36.5 g/dL    RDW 12.6 10.0 - 15.0 %    Platelet Count 254 150 - 450 10e3/uL    % Neutrophils 65 %    % Lymphocytes 23 %    % Monocytes 10 %    % Eosinophils 1 %    % Basophils 1 %    % Immature Granulocytes 0 %    NRBCs per 100 WBC 0 <1 /100    Absolute Neutrophils 5.5 1.6 - 8.3 10e3/uL    Absolute Lymphocytes 1.9 0.8 - 5.3 10e3/uL    Absolute Monocytes 0.9 0.0 - 1.3 10e3/uL    Absolute Eosinophils 0.1 0.0 - 0.7 10e3/uL    Absolute Basophils 0.0 0.0 - 0.2 10e3/uL    Absolute Immature Granulocytes 0.0 <=0.0 10e3/uL    Absolute NRBCs 0.0 10e3/uL   Extra Red Top Tube     Status: None   Result Value Ref Range    Hold Specimen JIC    Erythrocyte sedimentation rate auto     Status: Normal   Result Value Ref Range    Erythrocyte Sedimentation Rate 10 0 - 20 mm/hr   Lipid Profile     Status: Abnormal   Result Value Ref Range    Cholesterol 202 (H) <200 mg/dL    Triglycerides 94 <150 mg/dL    Direct Measure HDL 77 >=50 mg/dL    LDL Cholesterol Calculated 106 (H) <=100 mg/dL    Non HDL Cholesterol 125 <130 mg/dL   Lyme Disease Yamile with reflex to WB Serum     Status: Normal   Result Value Ref Range    Lyme Disease Antibodies Total 0.09 <0.90   Treponema Abs w Reflex to RPR and Titer     Status: Normal   Result Value Ref Range    Treponema Antibody Total Nonreactive Nonreactive   EKG 12 lead     Status: None   Result Value Ref Range    Systolic Blood Pressure  mmHg    Diastolic Blood Pressure  mmHg    Ventricular Rate 95 BPM    Atrial Rate 95 BPM    CA Interval 150 ms    QRS Duration 74 ms     ms    QTc 495 ms    P Axis 71 degrees    R AXIS 113 degrees    T Axis 64 degrees    Interpretation ECG       Sinus rhythm  Right axis deviation  Prolonged QT  Abnormal ECG  Unconfirmed report - interpretation of this ECG is computer generated - see medical record for final interpretation  Confirmed  by - EMERGENCY ROOM, PHYSICIAN ([id number]),  LYDIA MENCHACA ([id number]) on 9/10/2021 1:44:40 PM     Urine Drugs of Abuse Screen     Status: Normal    Narrative    The following orders were created for panel order Urine Drugs of Abuse Screen.  Procedure                               Abnormality         Status                     ---------                               -----------         ------                     Drug abuse screen 1 urin...[[id number]]  Normal              Final result                 Please view results for these tests on the individual orders.   Extra Tube     Status: None    Narrative    The following orders were created for panel order Extra Tube.  Procedure                               Abnormality         Status                     ---------                               -----------         ------                     Extra Red Top Tube[[id number]]                               Final result                 Please view results for these tests on the individual orders.

## 2021-09-13 NOTE — PROGRESS NOTES
Attended this evening's leisure exploration and participation group offered for increased intrinsic motivation to engage in social, non-obligatory occupations via a group game. Structured group was used to promote positive milieu interaction and collaboration. Pt Response: Opted to complete a puzzle while engaged in group conversation. Presented with a pleasant and bright affect. Hyperverbal; presented several questions and thoughts yet remained reality oriented and appropriate. Reported interest in gifting thank-you presents to staff and bring [employer] to peer after discharge. When discussed professional and unit boundaries, Pt expressed intent of a plan for her sister to bring items as a loop hole.     Dinorah Barr, JENNA on 9/12/2021 at 8:31 PM

## 2021-09-13 NOTE — PLAN OF CARE
"Pt in and out of the room during shift. Pt denies SI/SIB HI. Pt quote \"Understands where doctor might think she is bipolar ''. Pt presents with clear manic behavior, and OCD. Pt has flight of ideas, pressured speech, and rambles. Pt was quick to explain how and why she got to the unit. Pt has letter sized paper cover one wall explaining the days leading up to her admission and her current stay at the hospital. Pt believes due to  camp she got off her regular schedule by stopping her daily 1.5 hour naps, eating less due to fasting, and sleeping less at night due to being around friends late and waking up at 7AM for breakfast. Pt states she \"knows why she is here, and knows how t fix it by taking care of myself through sleep, eating better, and making sure I take my regular nap.\" Writer observed no aggression or agitation, though the patient is fidgety, restless, and is having trouble taking her nap and falling asleep. Pt states this is due to the noise and area of her room, possibly switching to another room farther from the TV area would be helpful. Pt received many phone calls today which disrupted the Milieu, and agitated other patients due to only one phone in use. Pt was made aware of this concern and asked to tell friends and family that she will call them when she can talk, instead of them calling the unit. Pt is hopeful of discharging tomorrow. 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 Pt expressed that not being able to have her phone to contact them for \"a few minutes' ' is what is causing a lot of her anxiety and refraining her from napping or sleeping. Pt is medication compliant, cooperative, and overall had an unremarkable shift. No physical health concerns noted. Continue to monitor and assess.    Problem: Adult Inpatient Plan of Care  Goal: Optimal Comfort and " Wellbeing  Outcome: No Change  Goal: Readiness for Transition of Care  Outcome: No Change     Problem: Behavior Regulation Impairment (Manic or Hypomanic Signs/Symptoms)  Goal: Improved Impulse Control (Manic/Hypomanic Signs/Symptoms)  Outcome: No Change

## 2021-09-14 NOTE — TELEPHONE ENCOUNTER
It looks like she has a psychiatrist appointment today at noon, therapist appointment tomorrow and partial hospitalization assessment next week, so you do not need to call.

## 2021-09-14 NOTE — TELEPHONE ENCOUNTER
Neisha,    Typically RNs make outreach phone calls to pts that have been discharged. According to hospital discharge reports this pt has been discharged 9/13 after admission for mental health issue    There is no discharge notes that can be seen.    Are you able to see any notes or plan. Difficult for RN to call pt back without any background knowledge.    What do you advise?  Keira Montalvo, RN

## 2021-09-27 NOTE — TELEPHONE ENCOUNTER
HP Triage,    Patient called.  Wanting to discuss her medications/medication list with someone.      Thanks,  Neida Olguin RN

## 2021-09-27 NOTE — TELEPHONE ENCOUNTER
Attempted to call patient.  Phone rang several times and then went to a busy signal.  Please try again.  Vianca Adams RN  [facility]

## 2021-09-28 NOTE — PROGRESS NOTES
Clinic Care Coordination Contact  [facility]/Voicemail       Clinical Data: Care Coordinator Outreach  Outreach attempted x 1.  Left message on patient's voicemail with call back information and requested return call.  Plan: . Care Coordinator will try to reach patient again in 1-2 business days.    Natalie Pinto [facility] Care Coordination  Tel: [phone]

## 2021-09-28 NOTE — TELEPHONE ENCOUNTER
"Neisha/Arnol/Care Coordination-  1. Please review.  Patient scheduled with PCP on 10/4/21 for follow up.  2.  Patient needs assistance with care transition since discharge from  Hospital on 9/13/21   A. Patient was unable to complete Psychiatry visit via Zoom because that Psychiatrist did not receive her discharge papers-per patient's report   B. Writer notes patient was no show for 9/21/21 partial hospitalization program intake visit   C. Patient reported time of individual therapy follow up was written down incorrectly on discharge papers  3. Writer unsure if patient qualifies for Behavioral Health Home    Writer called patient who stated:  1. Got a full night's sleep last night but did not take Melatonin nor Olanzapine   A. Plans to not take Olanzapine nor Melatonin   B. Olanzapine made patient nervous   C. Taking Seroquel as prescribed  2. Currently staying in a hotel   A. Expressed frustration over parents calling 911, which lead to hospitalization   B. Feels being discharged to her parents's home was not a good idea  3. [place] hospitalization was a restrictive environment  4. Feels her \"life went downhill fast\"  5. Does not understand why she was hospitalized  6. Has appts set up for therapist and Psychiatrist (writer did not get appt date/time)  7. Was told upon discharge she needed PCP follow up for JOSEFA result A. Also needs pap/HPV test for follow up  8. Needs help sorting through her bills  9. Was unable to inform employer she was hospitalized-this was frustrating to her    Writer informed patient:  1. Olanzapine was prescribed as an as needed medication, so patient does not need to take this medication if it was not helpful  2. Melatonin is available over-the-counter and if patient feels it is not needed, it should be fine to stop Melatonin  3. Offered follow up appt with BROCK Booker CNP, on 10/4/21 at 1620.  4. Offered Care Coordination referral to help with financial concerns/bills, and care transition  5. " Reviewed September 2020 pap/HPV follow up recommendations-offered pap/HPV test could be addressed during 10/4/21 visit    Patient verbalized understanding and in agreement with plan.    Patient responded:  1. Agreed to 10/4/21 appt with PCP.  Appt date, time and location confirmed with patient  2. Declined Care Coordination assistance with financial concerns/bills and agreed to Care Coordination referral for care transition assistance    Confirmed 10/4/21 office visit once more with patient.    Thank you!  ANA M HuffmanN, RN  [facility]

## 2021-09-28 NOTE — LETTER
[facility] CARE COORDINATION    September 30, 2021    Mikaela Esquivel  [address]   [facility] [address]      Dear Mikaela,    I am a clinic care coordinator who works with Susan Booker NP at [facility]. I have been trying to reach you recently to introduce Clinic Care Coordination and to see if there was anything I could assist you with.  Below is a description of clinic care coordination and how I can further assist you.      The clinic care coordination team is made up of a registered nurse,  and community health worker who understand the health care system. The goal of clinic care coordination is to help you manage your health and improve access to the health care system in the most efficient manner. The team can assist you in meeting your health care goals by providing education, coordinating services, strengthening the communication among your providers and supporting you with any resource needs.    Please feel free to contact me at [phone] with any questions or concerns. We are focused on providing you with the highest-quality healthcare experience possible and that all starts with you.     Sincerely,     Natalie Pinto ROSELIA   [facility] Care Coordination  Tel: [phone]

## 2021-09-29 NOTE — PROGRESS NOTES
Chart was forwarded to [facility] for review.  [facility] reviewed discharge plan and noted the below follow-up appointments.  It appears patient missed her intake appointment with the partial hospital program and is unclear if she followed up with her psychiatrist or outpatient therapist.    [facility] noted clinic care coordinator is reaching out for support yesterday and [facility] will attempt to reach out to patient as well.           Discharge Plan:   Mental Health Follow-Up:  - Psychiatry Follow-up care:  [facility]  Appointment: Tuesday, September 14th @ 12pm w Dr. Obrien   This appointment will be via Zoom and they will email you directions to [email or web address]  Dr Obrien does not take insurance so this is an out of pocket expense which you agreed too and wanted to set up  - Individual Therapy Follow-up  [facility] ([place] office)  This will be via Tele-Health  Appointment: Wednesday, September 15th @ 1pm with Majo Ragsdale  [address], [address]  [place] [address]  Phone: [phone]  Fax: [phone]  [facility] TO FAX AVS      - Partial Hospitalization Program is recommended.  [facility] - (program is currently virtual)   Intake Appointment:  Tuesday, September 21st @ 8am w/ Rosario Aponte (this will be a 90 minute video appt)  The email they will send you information at is: [email or web address]  [facility] Treatment   Located in [place] [address]   [address] [place] [address]    if questions about scheduling contact out patient intake dept @ : [phone]

## 2021-09-29 NOTE — TELEPHONE ENCOUNTER
[facility] was forwarded recent phone encounter with [place] triage nurse.  Please see records for information.  [facility] reviewed the chart.  Please see documentation encounter of discharge plan from recent inpatient admission..  [facility] reached out to patient and left a voicemail explaining his role, noted had reviewed the chart and following up with mental health supports.  Furthermore, noted she had appointment on October 4 with her PCP whom she could request that someone at the clinic schedule with the [facility] at that time.  Unfortunately, commented that [facility] was on vacation that week but with returning the following week.

## 2021-09-30 NOTE — PROGRESS NOTES
Clinic Care Coordination Contact  [facility]/Voicemail       Clinical Data: Care Coordinator Outreach  Outreach attempted x 2.  Left message on patient's voicemail with call back information and requested return call.  Plan: Care Coordinator will send unable to contact letter with care coordinator contact information via [employer]. Care Coordinator will do no further outreaches at this time.    MARYCRUZ Young   [facility] Care Coordination  Tel: [phone]
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
## 2021-10-01 NOTE — LETTER
[facility]  Patient Centered Plan of Care  About Me:        Patient Name:  Mikaela Esquivel    YOB: 1980  Age:         41 year old   Jazmín MRN:    [id number] Telephone Information:  Home Phone [phone]   Mobile [phone]       Address:  [address]  [facility] [address] Email address:  [email or web address]      Emergency Contact(s)    Name Relationship Lgl Grd Work Phone Home Phone Mobile Phone   PHYLLIS CM Mother   [phone]            Primary language:  English     needed? No   [employer]:  [phone] op. 1  Other communication barriers:    Preferred Method of Communication:  Phone  Current living arrangement: I live alone  Mobility Status/ Medical Equipment: Independent    Health Maintenance  Health Maintenance Reviewed: Due/Overdue   Health Maintenance Due   Topic Date Due     INFLUENZA VACCINE (1) 09/01/2021     PAP FOLLOW-UP  09/09/2021     HPV FOLLOW-UP  09/09/2021         My Access Plan  Medical Emergency 911   Primary Clinic Line   -     24 Hour Appointment Line [phone] or  [phone]) (toll-free)   24 Hour Nurse Line [phone] (toll-free)   Preferred Urgent Care     Preferred Hospital [facility]  [phone]   Preferred Pharmacy [facility]     Behavioral Health Crisis Line The National Suicide Prevention Lifeline at 1-359.492.2005 or 911             My Care Team Members  Patient Care Team       Relationship Specialty Notifications Start End    Susan Booker, NP PCP - General   5/11/07     Phone: [phone] Fax: [phone]         [address] [place] [address] [facility] [address]    Siva Kirby DO Referring Physician Family Practice  7/15/15     REFERRING TO ORTHOPEDICS    Phone: [phone] Fax: [phone]         [address] [address]    uSsan Cardoso MD MD Family  Medicine - Sports Medicine  7/15/15     Phone: [phone] Fax: [phone]         [address] [facility] [address]    Mireille Walters MD Assigned OBGYN Provider   10/23/20     Phone: [phone] Fax: [phone]         [address] [place] [address] [facility] [address]    Idris Coleman MD Assigned Neuroscience Provider   11/29/20     Phone: [phone] Fax: [phone] [address] [place] [address]    Susan Booker NP Assigned PCP   4/25/21     Phone: [phone] Fax: [phone] [address] [facility] [address]    Natalie Pinto LSW Lead Care Coordinator Primary Care - [facility] Admissions 10/1/21     Phone: [phone]         Marlene Peralta Medical Assistant Primary Care - [facility] Admissions 10/1/21             My Care Plans  Self Management and Treatment Plan  Goals and (Comments)  Goals        General     Financial Wellbeing (pt-stated)      Notes - Note edited  10/1/2021 10:15 AM by Natalie Pinto LSW     Goal Statement: I want to have good mental health for the next 4 months  Date Goal set: 10/1/21  Barriers: psychosocial barriers  Strengths:medication compliant; willing to work wih therapy and psychiatry  Date to Achieve By: 02/1/21  Patient expressed understanding of goal: yes  Action steps to achieve this goal:  1. I will call the [facility] program at [phone]  2. I will continue with psychiatry with Dr. Pa cordero   3. I will start therapy once per week.                Action Plans on File:                       Advance Care Plans/Directives Type:        My Medical and Care Information  Problem List   Patient Active Problem List   Diagnosis     Generalized anxiety disorder     Overactive bladder     Lipoma of skin and subcutaneous tissue     Cervical high risk HPV (human papillomavirus) test positive     Psychosis, unspecified psychosis type (H)     Myopia     Bipolar 1 disorder (H)     Insomnia      Current Medications and Allergies:  See printed Medication  Report.    Care Coordination Start Date: 10/1/2021   Frequency of Care Coordination: monthly   Form Last Updated: 10/01/2021

## 2021-10-04 NOTE — PROGRESS NOTES
"    Assessment & Plan     Bipolar 1 disorder (H)  She will follow up with her therapist and psychiatrist.    Elevated antinuclear antibody (JOSEFA) level  Will check labs to further evaluate positive JOSEFA and will refer to rheumatology.  - RNP Antibody IgG  - Albumin Random Urine Quantitative with Creat Ratio  - Matson ABBY Antibody IgG  - DNA double stranded antibodies  - Lupus Anticoagulant Panel  - Cardiolipin Yamile IgG and IgM  - Anti Nuclear Yamile IgG by IFA with Reflex; Future  - Rheumatology Referral; Future  - Anti Nuclear Yamile IgG by IFA with Reflex    Overactive bladder    - Adult Urology Referral; Future  - trospium (SANCTURA) 20 MG tablet; Take 1 tablet (20 mg) by mouth At Bedtime    Cervical high risk HPV (human papillomavirus) test positive    - Pap diagnostic with HPV      42 minutes spent on the date of the encounter doing chart review, patient visit and documentation            No follow-ups on file.    Susan Booker NP  [facility]    Valdez Al is a 41 year old who presents for the following health issues     HPI       Hospital Follow-up Visit:    Hospital/Nursing Home/ Rehab Facility: [facility]  Date of Admission: 9/9/2021  Date of Discharge: 9/13/2021  Reason(s) for Admission: insomnia to to mental disorder.        Was your hospitalization related to COVID-19? No   Problems taking medications regularly:   yes  Medication changes since discharge:  Yes but pt is not talking anymore  Problems adhering to non-medication therapy:  none    Summary of hospitalization:  [facility] discharge summary reviewed  Diagnostic Tests/Treatments reviewed.  Follow up needed: labs to rule out Lupus  Other Healthcare Providers Involved in Patient s Care:         Specialist appointment - psychiatrist and therapist  Update since discharge: overall stable, but feels that her recent hospitalization was very traumatic and \"shattered\" her life Post " "Discharge Medication Reconciliation: discharge medications reconciled, continue medications without change.  Plan of care communicated with patient        She has appointment with her psychiatrist and therapist this week, met last week virtually.  She has some shortness of breath and some right upper chest pain, worse with inspiration.  No rashes, joint pain, sores in mouth or nose.        Review of Systems         Objective    /84 (BP Location: Left arm, Patient Position: Chair, Cuff Size: Adult Regular)   Pulse 67   Temp 97.4  F (36.3  C) (Tympanic)   Resp 15   Ht 1.6 m (5' 3\")   Wt 54.9 kg (121 lb)   LMP  (LMP Unknown)   SpO2 100%   BMI 21.43 kg/m    Body mass index is 21.43 kg/m .  Physical Exam   GENERAL: healthy, alert and no distress  RESP: lungs clear to auscultation - no rales, rhonchi or wheezes  CV: regular rate and rhythm, normal S1 S2, no S3 or S4, no murmur, click or rub, no peripheral edema and peripheral pulses strong   (female): normal female external genitalia, normal urethral meatus, vaginal mucosa, normal cervix/adnexa/uterus without masses or discharge  MS: no gross musculoskeletal defects noted, no edema  PSYCH: mentation appears normal, affect teary                "

## 2021-10-08 NOTE — TELEPHONE ENCOUNTER
MEDICAL RECORDS REQUEST   [facility]  Urology Clinic  [address] [place] [address]  PHONE: [phone]  Fax: [phone]        FUTURE VISIT INFORMATION                                                   Mikaela Esquivel, : 1980 scheduled for future visit at [facility]    APPOINTMENT INFORMATION:    Date: 10/18/2021    Provider:  Deysi Munroe PA     Reason for Visit/Diagnosis: Overactive Bladder    REFERRAL INFORMATION:    Referring provider:  Susan Booker NP    Specialty: N/A    Referring providers clinic:   [facility]    Clinic contact number:  N/A    RECORDS REQUESTED FOR VISIT                                                     NOTES  STATUS/DETAILS   OFFICE NOTE from referring provider  yes, 10/04/2021   OFFICE NOTE from other specialist  no   DISCHARGE SUMMARY from hospital  no   DISCHARGE REPORT from the ER  no   OPERATIVE REPORT  no   MEDICATION LIST  yes   LABS     URINALYSIS (UA)  no   URINE CYTOLOGY  no     PRE-VISIT CHECKLIST      Record collection complete Yes   Appointment appropriately scheduled           (right time/right provider) Yes   Joint diagnostic appointment coordinated correctly          (ensure right order & amount of time) Yes   MyChart activation Yes   Questionnaire complete If no, please explain pending

## 2021-10-15 NOTE — TELEPHONE ENCOUNTER
[facility] Call Center    Phone Message    May a detailed message be left on voicemail: yes     Reason for Call: Appointment Intake    Referring Provider Name: Susan Booker, NP in  [facility]  Diagnosis and/or Symptoms: Elevated antinuclear antibody (JOSEFA) level [R76.8]    Pt would like to be seen at the [facility].     Action Taken: Message routed to:  [facility] ([facility] Adult Rhe[facility]ology     Travel Screening: Not Applicable

## 2021-10-18 NOTE — PATIENT INSTRUCTIONS
UROLOGY CLINIC VISIT PATIENT INSTRUCTIONS    - Call the [facility] and schedule a lab only appointment to complete the urinalysis and urine culture which I have ordered.   - My schedulers will call you to set up a urodynamic study at our first available appointment.  They will also set up a follow up visit with me the same day after the urodynamic study is complete, or within a couple days after completing the test.   - Remain on the trospium (Sanctura) 20 mg daily.   - If you need anything from me in the meantime, please don't hesitate to let me know!    If you have any issues, questions or concerns in the meantime, do not hesitate to contact us at [phone] or via [employer].     It was a pleasure meeting with you today.  Thank you for allowing me and my team the privilege of caring for you today.  YOU are the reason we are here, and I truly hope we provided you with the excellent service you deserve.  Please let us know if there is anything else we can do for you so that we can be sure you are leaving completely satisfied with your care experience.    Deysi Munroe PA-C  Department of Urology

## 2021-10-18 NOTE — PROGRESS NOTES
Chief Complaint:   Overactive bladder         History of Present Illness:   Mikaela Esquivel is a 41 year old female with a history of depression and anxiety who presents for evaluation of overactive bladder.  The patient reports a long standing history of overactive bladder and urinary urgency which has been ongoing for ~20 years.  The patient reports she has previously seen urology many years ago.  She was started on trospium many years ago and reports noticing a mild improvement after she started it, but symptoms have continued to persist over the years.  She currently reports urinary frequency and urgency, along with urge incontinence.  She also reports that she will leak urine throughout the day without feeling that she needs to void.  She feels she fully empties her bladder.  She denies any dysuria or gross hematuria.  She admits to nocturia 1-2 times per night, despite limiting fluids in the evening.  She denies any caffeine intake, though will occasionally have bubbly water.      Of note, the patient was recently hospitalized at [facility] from 9/9/2021 to 9/13/2021 due to signs and symptoms of kishore, and was diagnosed with bipolar disorder type I.  She also has a history of anxiety and OCD.  She is currently working with a therapist and psychiatrist with the [facility].  She is also currently undergoing evaluation for autoimmune disorders, and is waiting to get in to see a rheumatologist.  She is tearful during our visit discussing her mental health and her struggles with getting in to see rheumatology.  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  She is currently living in a hotel due to conflict with her parents.  She denies any suicidal ideation and feels safe at this time.           Past Medical History:     Past Medical History:   Diagnosis Date     ASCUS favor benign 01/2013    Neg HR HPV     Cervical high risk HPV (human papillomavirus) test  positive 7/2015, 01/07/19, 03/03/20 7/2015, 01/07/19, 03/03/20, 10/4/21     Depressive disorder, not elsewhere classified      Generalized anxiety disorder      Urgency of urination             Past Surgical History:     Past Surgical History:   Procedure Laterality Date     EYE SURGERY      LASIK     SURGICAL HISTORY OF -   2010    eye surgery            Medications     Current Outpatient Medications   Medication     ferrous gluconate (FERGON) 324 (38 Fe) MG tablet     OLANZapine (ZYPREXA) 5 MG tablet     trospium (SANCTURA) 20 MG tablet     VITAMIN D PO     QUEtiapine (SEROQUEL) 100 MG tablet     No current facility-administered medications for this visit.            Family History:     Family History   Adopted: Yes   Problem Relation Age of Onset     Unknown/Adopted Mother      Unknown/Adopted Father      Unknown/Adopted Maternal Grandmother      Family History Negative No family hx of         Pt adopted            Social History:     Social History     Socioeconomic History     Marital status: Single     Spouse name: Not on file     Number of children: Not on file     Years of education: Not on file     Highest education level: Not on file   Occupational History     Not on file   Tobacco Use     Smoking status: Never Smoker     Smokeless tobacco: Never Used   Substance and Sexual Activity     Alcohol use: Yes     Comment: occasionally: 2 or less per week      Drug use: No     Sexual activity: Yes     Partners: Male     Birth control/protection: None, Condom   Other Topics Concern     Parent/sibling w/ CABG, MI or angioplasty before 65F 55M? No   Social History Narrative    Dairy/d 2-3 servings/d.     Caffeine 0 servings/d    Exercise 3 x week biking,play frisbee    Sunscreen used - Yes    Seatbelts used - Yes    Working smoke/CO detectors in the home - Yes    Guns stored in the home - No    Self Breast Exams - No    Self Testicular Exam - NOT APPLICABLE    Eye Exam up to date - yes    Dental Exam up to date -  Yes    Pap Smear up to date - No    Mammogram up to date - NOT APPLICABLE    PSA up to date - NOT APPLICABLE    Dexa Scan up to date - NOT APPLICABLE    Flex Sig / Colonoscopy up to date - NOT APPLICABLE    Immunizations up to date - Yes-Td 2005    Abuse: Current or Past(Physical, Sexual or Emotional)- No    Do you feel safe in your environment - Yes     Social Determinants of Health     Financial Resource Strain:      Difficulty of Paying Living Expenses:    Food Insecurity:      Worried About Running Out of Food in the Last Year:      Ran Out of Food in the Last Year:    Transportation Needs:      Lack of Transportation (Medical):      Lack of Transportation (Non-Medical):    Physical Activity:      Days of Exercise per Week:      Minutes of Exercise per Session:    Stress:      Feeling of Stress :    Social Connections:      Frequency of Communication with Friends and Family:      Frequency of Social Gatherings with Friends and Family:      Attends Alevism Services:      Active Member of Clubs or Organizations:      Attends Club or Organization Meetings:      Marital Status:    Intimate Partner Violence:      Fear of Current or Ex-Partner:      Emotionally Abused:      Physically Abused:      Sexually Abused:             Allergies:   Patient has no known allergies.         Review of Systems:  From intake questionnaire   Negative 14 system review except as noted on HPI, nurse's note.         Physical Exam:   Patient is a 41 year old  female   General Appearance Adult: Alert, oriented, tearful throughout the visit  Lungs: no respiratory distress, or pursed lip breathing  Heart: No obvious jugular venous distension present  Skin: no suspicious lesions or rashes  Neuro: Alert, oriented, speech and mentation normal  Further examination is deferred due to the nature of our visit.        Labs and Pathology:    I personally reviewed all applicable laboratory data and went over findings with patient  Significant  for:    CBC RESULTS:  Recent Labs   Lab Test 09/09/21  1553 11/12/20  0745   WBC 8.4 7.1   HGB 12.6 13.8    254        BMP RESULTS:  Recent Labs   Lab Test 09/09/21  1552 11/12/20  0745 03/03/20  0837    139  --    POTASSIUM 3.4 3.9  --    CHLORIDE 107 107  --    CO2 27 25  --    ANIONGAP 6 7  --    GLC 89 96 104*   BUN 6* 9  --    CR 0.74 0.70  --    GFRESTIMATED >90 >90  --    GFRESTBLACK  --  >90  --    SUHAIL 8.8 9.3  --          Imaging:    I personally reviewed all applicable imaging and went over findings with patient.  Significant for: no recent relevant imaging          Assessment and Plan:     Assessment: 41 year old female with a history of overactive bladder, urinary urgency and urge incontinence.  She is currently maintained on trospium for many years with continued symptoms despite medication.  Patient also currently working with psychiatry and therapy for recent mental health issues, and has been referred to rheumatology for evaluation of potential autoimmune disorder.  Discussed with patient option for switching medications from trospium to Myrbetriq versus further evaluation with urodynamics study, and patient elected to proceed with UDS prior to making any medication changes.  Will plan to schedule UDS for next available with follow up appointment with me after UDS completed to review findings.  Patient will continue trospium 20 mg daily in the interim.  Will also obtain an updated UA/UC to ensure no infection contributing to symptoms.  Patient tearful throughout our visit in regards to her recent mental and physical health issues.  Support provided and patient endorses feeling safe at this time with no suicidal ideation.  She will continue to work with her established psychologist and psychiatrist with [facility].  Patient will also continue to work with her PCP to establish an appointment with rheumatology for further evaluation of possible autoimmune disease.      Plan:  -  Make lab only appointment for urinalysis and urine culture.   - Schedule urodynamic study at next available and follow up appointment with me afterward.   - Continue trospium 20 mg daily.       ARJUN Haley  Department of Urology

## 2021-10-18 NOTE — PROGRESS NOTES
Mikaela is a 41 year old who is being evaluated via a billable video visit.      How would you like to obtain your AVS? Mail a copy  If the video visit is dropped, the invitation should be resent by: Send to e-mail at: [email or web address]  Will anyone else be joining your video visit? No      Video Start Time: 12:36 PM  Video-Visit Details    Type of service:  Video Visit    Video End Time:1:06 PM    Originating Location (pt. Location): Home    Distant Location (provider location):  [facility] [place]     Platform used for Video Visit: [employer]

## 2021-10-18 NOTE — TELEPHONE ENCOUNTER
Reason for visit: consult for overactive bladder     Relevant information: hx overactive bladder    Problem list   Patient Active Problem List   Diagnosis     Generalized anxiety disorder     Overactive bladder     Lipoma of skin and subcutaneous tissue     Cervical high risk HPV (human papillomavirus) test positive     Psychosis, unspecified psychosis type (H)     Myopia     Bipolar 1 disorder (H)     Insomnia       Records/imaging/labs/orders: records available    Pt called: n/a    At Rooming: virtual

## 2021-10-18 NOTE — LETTER
10/18/2021       RE: Mikaela Esquivel  [address]  [facility] [address]     Dear Colleague,    Thank you for referring your patient, Mikaela Esquivel, to the [facility] [place] at [facility]. Please see a copy of my visit note below.    Mikaela is a 41 year old who is being evaluated via a billable video visit.      How would you like to obtain your AVS? Mail a copy  If the video visit is dropped, the invitation should be resent by: Send to e-mail at: [email or web address]  Will anyone else be joining your video visit? No      Video Start Time: 12:36 PM  Video-Visit Details    Type of service:  Video Visit    Video End Time:1:06 PM    Originating Location (pt. Location): Home    Distant Location (provider location):  [facility] [place]     Platform used for Video Visit: Konoz          Chief Complaint:   Overactive bladder         History of Present Illness:   Mikaela Esquivel is a 41 year old female with a history of depression and anxiety who presents for evaluation of overactive bladder.  The patient reports a long standing history of overactive bladder and urinary urgency which has been ongoing for ~20 years.  The patient reports she has previously seen urology many years ago.  She was started on trospium many years ago and reports noticing a mild improvement after she started it, but symptoms have continued to persist over the years.  She currently reports urinary frequency and urgency, along with urge incontinence.  She also reports that she will leak urine throughout the day without feeling that she needs to void.  She feels she fully empties her bladder.  She denies any dysuria or gross hematuria.  She admits to nocturia 1-2 times per night, despite limiting fluids in the evening.  She denies any caffeine intake, though will occasionally have bubbly water.      Of note, the patient was recently  hospitalized at [facility] from 9/9/2021 to 9/13/2021 due to signs and symptoms of kishore, and was diagnosed with bipolar disorder type I.  She also has a history of anxiety and OCD.  She is currently working with a therapist and psychiatrist with the [facility].  She is also currently undergoing evaluation for autoimmune disorders, and is waiting to get in to see a rheumatologist.  She is tearful during our visit discussing her mental health and her struggles with getting in to see rheumatology.  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  She is currently living in a hotel due to conflict with her parents.  She denies any suicidal ideation and feels safe at this time.           Past Medical History:     Past Medical History:   Diagnosis Date     ASCUS favor benign 01/2013    Neg HR HPV     Cervical high risk HPV (human papillomavirus) test positive 7/2015, 01/07/19, 03/03/20 7/2015, 01/07/19, 03/03/20, 10/4/21     Depressive disorder, not elsewhere classified      Generalized anxiety disorder      Urgency of urination             Past Surgical History:     Past Surgical History:   Procedure Laterality Date     EYE SURGERY      LASIK     SURGICAL HISTORY OF -   2010    eye surgery            Medications     Current Outpatient Medications   Medication     ferrous gluconate (FERGON) 324 (38 Fe) MG tablet     OLANZapine (ZYPREXA) 5 MG tablet     trospium (SANCTURA) 20 MG tablet     VITAMIN D PO     QUEtiapine (SEROQUEL) 100 MG tablet     No current facility-administered medications for this visit.            Family History:     Family History   Adopted: Yes   Problem Relation Age of Onset     Unknown/Adopted Mother      Unknown/Adopted Father      Unknown/Adopted Maternal Grandmother      Family History Negative No family hx of         Pt adopted            Social History:     Social History     Socioeconomic History     Marital status: Single     Spouse name: Not  on file     Number of children: Not on file     Years of education: Not on file     Highest education level: Not on file   Occupational History     Not on file   Tobacco Use     Smoking status: Never Smoker     Smokeless tobacco: Never Used   Substance and Sexual Activity     Alcohol use: Yes     Comment: occasionally: 2 or less per week      Drug use: No     Sexual activity: Yes     Partners: Male     Birth control/protection: None, Condom   Other Topics Concern     Parent/sibling w/ CABG, MI or angioplasty before 65F 55M? No   Social History Narrative    Dairy/d 2-3 servings/d.     Caffeine 0 servings/d    Exercise 3 x week biking,play frisbee    Sunscreen used - Yes    Seatbelts used - Yes    Working smoke/CO detectors in the home - Yes    Guns stored in the home - No    Self Breast Exams - No    Self Testicular Exam - NOT APPLICABLE    Eye Exam up to date - yes    Dental Exam up to date - Yes    Pap Smear up to date - No    Mammogram up to date - NOT APPLICABLE    PSA up to date - NOT APPLICABLE    Dexa Scan up to date - NOT APPLICABLE    Flex Sig / Colonoscopy up to date - NOT APPLICABLE    Immunizations up to date - Yes-Td 2005    Abuse: Current or Past(Physical, Sexual or Emotional)- No    Do you feel safe in your environment - Yes     Social Determinants of Health     Financial Resource Strain:      Difficulty of Paying Living Expenses:    Food Insecurity:      Worried About Running Out of Food in the Last Year:      Ran Out of Food in the Last Year:    Transportation Needs:      Lack of Transportation (Medical):      Lack of Transportation (Non-Medical):    Physical Activity:      Days of Exercise per Week:      Minutes of Exercise per Session:    Stress:      Feeling of Stress :    Social Connections:      Frequency of Communication with Friends and Family:      Frequency of Social Gatherings with Friends and Family:      Attends [employer] Services:      Active Member of Clubs or Organizations:       Attends Club or Organization Meetings:      Marital Status:    Intimate Partner Violence:      Fear of Current or Ex-Partner:      Emotionally Abused:      Physically Abused:      Sexually Abused:             Allergies:   Patient has no known allergies.         Review of Systems:  From intake questionnaire   Negative 14 system review except as noted on HPI, nurse's note.         Physical Exam:   Patient is a 41 year old  female   General Appearance Adult: Alert, oriented, tearful throughout the visit  Lungs: no respiratory distress, or pursed lip breathing  Heart: No obvious jugular venous distension present  Skin: no suspicious lesions or rashes  Neuro: Alert, oriented, speech and mentation normal  Further examination is deferred due to the nature of our visit.        Labs and Pathology:    I personally reviewed all applicable laboratory data and went over findings with patient  Significant for:    CBC RESULTS:  Recent Labs   Lab Test 09/09/21  1553 11/12/20  0745   WBC 8.4 7.1   HGB 12.6 13.8    254        BMP RESULTS:  Recent Labs   Lab Test 09/09/21  1552 11/12/20  0745 03/03/20  0837    139  --    POTASSIUM 3.4 3.9  --    CHLORIDE 107 107  --    CO2 27 25  --    ANIONGAP 6 7  --    GLC 89 96 104*   BUN 6* 9  --    CR 0.74 0.70  --    GFRESTIMATED >90 >90  --    GFRESTBLACK  --  >90  --    SUHAIL 8.8 9.3  --          Imaging:    I personally reviewed all applicable imaging and went over findings with patient.  Significant for: no recent relevant imaging          Assessment and Plan:     Assessment: 41 year old female with a history of overactive bladder, urinary urgency and urge incontinence.  She is currently maintained on trospium for many years with continued symptoms despite medication.  Patient also currently working with psychiatry and therapy for recent mental health issues, and has been referred to rheumatology for evaluation of potential autoimmune disorder.  Discussed with patient option for  switching medications from trospium to Myrbetriq versus further evaluation with urodynamics study, and patient elected to proceed with UDS prior to making any medication changes.  Will plan to schedule UDS for next available with follow up appointment with me after UDS completed to review findings.  Patient will continue trospium 20 mg daily in the interim.  Will also obtain an updated UA/UC to ensure no infection contributing to symptoms.  Patient tearful throughout our visit in regards to her recent mental and physical health issues.  Support provided and patient endorses feeling safe at this time with no suicidal ideation.  She will continue to work with her established psychologist and psychiatrist with [facility].  Patient will also continue to work with her PCP to establish an appointment with rheumatology for further evaluation of possible autoimmune disease.      Plan:  - Make lab only appointment for urinalysis and urine culture.   - Schedule urodynamic study at next available and follow up appointment with me afterward.   - Continue trospium 20 mg daily.       ARJUN Haley  Department of Urology

## 2021-10-19 NOTE — TELEPHONE ENCOUNTER
LVm for patient to set up a urodynamic study at our first available appointment.  They will also set up a follow up visit with me the same day after the urodynamic study is complete, or within a couple days after completing the test.

## 2021-10-19 NOTE — TELEPHONE ENCOUNTER
Component      Latest Ref Rng & Units 10/4/2021   RNP Yamile IgG Instrument Value      <5.0 U/mL <1.1   RNP Antibody IgG      Negative Negative   Matson ABBY Yamile IgG Instrument Value      <7.0 U/mL 1.6   Matson ABBY Antibody IgG      Negative Negative   DNA-ds      <10.0 IU/mL 2.0   JOSEFA interpretation      Negative Negative       Per protocol, patient does not fit the criteria for an appointment at the [facility]. Routing to referral team.    Bess Collazo CMA   10/19/2021 8:23 AM

## 2021-10-20 NOTE — TELEPHONE ENCOUNTER
Noted on referral will follow with referral.     Bess Collazo CMA  P Scheduling Adult Rheumatology  Cc: Cathy Galan, RN  Caller: Unspecified (5 days ago,  8:09 AM)  Referral team - this does not fit our criteria for an appt     Lili allison, routed to your pool by mistake.     Thanks   Bess

## 2021-10-21 NOTE — NURSING NOTE
"Chief Complaint   Patient presents with     Urodynamics Study       Blood pressure 121/81, pulse (!) 123, height 1.6 m (5' 3\"), weight 56.7 kg (125 lb). Body mass index is 22.14 kg/m .    Patient Active Problem List   Diagnosis     Generalized anxiety disorder     Overactive bladder     Lipoma of skin and subcutaneous tissue     Cervical high risk HPV (human papillomavirus) test positive     Psychosis, unspecified psychosis type (H)     Myopia     Bipolar 1 disorder (H)     Insomnia       No Known Allergies    Current Outpatient Medications   Medication Sig Dispense Refill     ferrous gluconate (FERGON) 324 (38 Fe) MG tablet Take 1 tablet (324 mg) by mouth daily (with breakfast)       OLANZapine (ZYPREXA) 5 MG tablet Take 5 mg by mouth At Bedtime       QUEtiapine (SEROQUEL) 100 MG tablet Take 1 tablet (100 mg) by mouth At Bedtime 30 tablet 0     trospium (SANCTURA) 20 MG tablet Take 1 tablet (20 mg) by mouth At Bedtime 90 tablet 3     VITAMIN D PO          Social History     Tobacco Use     Smoking status: Never Smoker     Smokeless tobacco: Never Used   Substance Use Topics     Alcohol use: Yes     Comment: occasionally: 2 or less per week      Drug use: No       Lisa Vazquez, DAVID  10/21/2021  9:02 AM  "
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
## 2021-10-28 NOTE — PROGRESS NOTES
Chief Complaint:   Follow up, post UDS         History of Present Illness:   Mikaela Esquivel is a 41 year old female with a history of bipolar disorder, depression and anxiety who presents for follow up of symptoms of overactive bladder.  The patient was initially seen for consult on 10/18/2021 at which time she endorsed a long standing history of overactive bladder and urinary urgency which has been ongoing for ~20 years, and is maintained on trospium 20 mg daily.  She currently reports urinary frequency and urgency, along with urge incontinence.  She also reports that she will leak urine throughout the day without feeling that she needs to void.  She feels she fully empties her bladder.  She denies any dysuria or gross hematuria.  She admits to nocturia 1-2 times per night, despite limiting fluids in the evening.  She denies any caffeine intake, though will occasionally have bubbly water.      She was subsequently referred for UDS which was completed on 10/21/2021 and showed normal bladder capacity (455 ml) with good compliance without DO.  She emptied her bladder well (PVR 0 ml) with a continuous flow (Qmax 31 ml/s) and no evidence for bladder outlet obstruction.  She was noted to have some increased EMG activity during voiding, likely correlating with high tone pelvic floor.      Today she reports continued urinary symptoms of frequency and urgency.  She reports that she has an appointment scheduled with rheumatology in a couple months, and was recently changed to a different medication by her psychiatrist which is significantly helping her mood and she feels much more stable.  She does endorse chronic symptoms of constipation, and states she is currently having one bowel movement every three days.           Past Medical History:     Past Medical History:   Diagnosis Date     ASCUS favor benign 01/2013    Neg HR HPV     Cervical high risk HPV (human papillomavirus) test positive 7/2015, 01/07/19, 03/03/20     7/2015, 01/07/19, 03/03/20, 10/4/21     Depressive disorder, not elsewhere classified      Generalized anxiety disorder      Urgency of urination             Past Surgical History:     Past Surgical History:   Procedure Laterality Date     EYE SURGERY      LASIK     SURGICAL HISTORY OF -   2010    eye surgery            Medications     Current Outpatient Medications   Medication     ferrous gluconate (FERGON) 324 (38 Fe) MG tablet     lamoTRIgine (LAMICTAL) 25 MG tablet     OLANZapine (ZYPREXA) 5 MG tablet     QUEtiapine (SEROQUEL) 100 MG tablet     trospium (SANCTURA) 20 MG tablet     VITAMIN D PO     No current facility-administered medications for this visit.            Family History:     Family History   Adopted: Yes   Problem Relation Age of Onset     Unknown/Adopted Mother      Unknown/Adopted Father      Unknown/Adopted Maternal Grandmother      Family History Negative No family hx of         Pt adopted            Social History:     Social History     Socioeconomic History     Marital status: Single     Spouse name: Not on file     Number of children: Not on file     Years of education: Not on file     Highest education level: Not on file   Occupational History     Not on file   Tobacco Use     Smoking status: Never Smoker     Smokeless tobacco: Never Used   Substance and Sexual Activity     Alcohol use: Yes     Comment: occasionally: 2 or less per week      Drug use: No     Sexual activity: Yes     Partners: Male     Birth control/protection: None, Condom   Other Topics Concern     Parent/sibling w/ CABG, MI or angioplasty before 65F 55M? No   Social History Narrative    Dairy/d 2-3 servings/d.     Caffeine 0 servings/d    Exercise 3 x week biking,play frisbee    Sunscreen used - Yes    Seatbelts used - Yes    Working smoke/CO detectors in the home - Yes    Guns stored in the home - No    Self Breast Exams - No    Self Testicular Exam - NOT APPLICABLE    Eye Exam up to date - yes    Dental Exam up to  date - Yes    Pap Smear up to date - No    Mammogram up to date - NOT APPLICABLE    PSA up to date - NOT APPLICABLE    Dexa Scan up to date - NOT APPLICABLE    Flex Sig / Colonoscopy up to date - NOT APPLICABLE    Immunizations up to date - Yes-Td 2005    Abuse: Current or Past(Physical, Sexual or Emotional)- No    Do you feel safe in your environment - Yes     Social Determinants of Health     Financial Resource Strain:      Difficulty of Paying Living Expenses:    Food Insecurity:      Worried About Running Out of Food in the Last Year:      Ran Out of Food in the Last Year:    Transportation Needs:      Lack of Transportation (Medical):      Lack of Transportation (Non-Medical):    Physical Activity:      Days of Exercise per Week:      Minutes of Exercise per Session:    Stress:      Feeling of Stress :    Social Connections:      Frequency of Communication with Friends and Family:      Frequency of Social Gatherings with Friends and Family:      Attends Mosque Services:      Active Member of Clubs or Organizations:      Attends Club or Organization Meetings:      Marital Status:    Intimate Partner Violence:      Fear of Current or Ex-Partner:      Emotionally Abused:      Physically Abused:      Sexually Abused:             Allergies:   Patient has no known allergies.         Review of Systems:  From intake questionnaire   Negative 14 system review except as noted on HPI, nurse's note.         Physical Exam:   Patient is a 41 year old  female   General Appearance Adult: Alert, no acute distress, oriented  Lungs: no respiratory distress, or pursed lip breathing  Heart: No obvious jugular venous distension present  Skin: no suspicious lesions or rashes  Neuro: Alert, oriented, speech and mentation normal  Further examination is deferred due to the nature of our visit.        Labs and Pathology:    I personally reviewed all applicable laboratory data and went over findings with patient  Significant for:    CBC  RESULTS:  Recent Labs   Lab Test 09/09/21  1553 11/12/20  0745   WBC 8.4 7.1   HGB 12.6 13.8    254        BMP RESULTS:  Recent Labs   Lab Test 09/09/21  1552 11/12/20  0745 03/03/20  0837    139  --    POTASSIUM 3.4 3.9  --    CHLORIDE 107 107  --    CO2 27 25  --    ANIONGAP 6 7  --    GLC 89 96 104*   BUN 6* 9  --    CR 0.74 0.70  --    GFRESTIMATED >90 >90  --    GFRESTBLACK  --  >90  --    SUHAIL 8.8 9.3  --        UA RESULTS:   Recent Labs   Lab Test 10/21/21  0958 10/21/21  0953   SG 1.012 1.025   URINEPH 6.0 6.5   NITRITE Negative Negative   RBCU 1  --    WBCU <1  --          Imaging:    I personally reviewed all applicable imaging and went over findings with patient.  Significant for: no recent relevant imaging          Assessment and Plan:     Assessment: 41 year old female with long standing symptoms of urinary frequency, urgency, and incontinence.  Recent UDS with normal bladder capacity, compliance, flow, and emptying.  No evidence of detrusor overactivity or stress incontinence, and no evidence of bladder outlet obstruction.  She was noted to have some increased EMG activity during voiding, likely correlating with high tone pelvic floor.  Discussed referral to pelvic floor physical therapy for treatment, and patient in agreement.  Referral order placed for PFPT.  Patient also with baseline constipation, which is likely also contributing to her irritative urinary symptoms.  Discussed management of constipation, including hydration, increased fiber, and Miralax.  Would recommend continuing trospium at this time; if symptoms significantly improve with PFPT, then could consider discontinuing the trospium and monitor symptoms.  Patient in agreement with plan.     Plan:  - Proceed with pelvic floor physical therapy.   - Follow up with me in 3-4 months for recheck.  If symptoms are significantly improved with the PFPT, then could cancel appointment if no concerns.   - Continue trospium 20 mg daily.    - Treat constipation with hydration, fiber supplement, and Miralax.       ARJUN Haley  Department of Urology

## 2021-10-28 NOTE — PROGRESS NOTES
Mikaela is a 41 year old who is being evaluated via a billable video visit.      How would you like to obtain your AVS? Mail a copy  If the video visit is dropped, the invitation should be resent by: Send to e-mail at: [email or web address]  Will anyone else be joining your video visit? No      Video Start Time: 7:31 AM  Video-Visit Details    Type of service:  Video Visit    Video End Time:7:47 AM    Originating Location (pt. Location): Home    Distant Location (provider location):  [facility] UROLOGY [facility] [place]     Platform used for Video Visit: [employer]

## 2021-10-28 NOTE — PATIENT INSTRUCTIONS
UROLOGY CLINIC VISIT PATIENT INSTRUCTIONS    - Recommend proceeding with pelvic floor physical therapy.  See below for locations and the number for scheduling.   - Follow up with me in 3-4 months for recheck.  If you are doing great and symptoms are much improved with the physical therapy, then send me a [employer] message and we can cancel this appointment if you'd like.   - Continue on the trospium (Sanctura) 20 mg daily.   - Work on treating constipation, as that is likely contributing to your urinary symptoms.  Goal is for at least one soft, formed stool per day.  Would recommend either over the counter Metamucil (or any fiber supplement), or Miralax.  You can take up to a cap full of the Miralax per day.   - I'm glad you got the appointment with rheumatology and are starting to feel a little better!      If you have any issues, questions or concerns in the meantime, do not hesitate to contact us at [phone] or via [employer].     It was a pleasure meeting with you today.  Thank you for allowing me and my team the privilege of caring for you today.  YOU are the reason we are here, and I truly hope we provided you with the excellent service you deserve.  Please let us know if there is anything else we can do for you so that we can be sure you are leaving completely satisfied with your care experience.    Deysi Munroe PA-C  Department of Urology    ----------------------------------------------------    Physical Therapy Referral    Please call [phone] to make an appointment     [facility] - [email or web address]  [facility] - [email or web address]    Locations:    [facility] - U-Ortho PT [place]   [place] [facility] [place]   [facility] [place] [facility] [place] [facility] [place]   [place] [facility] [place]

## 2021-10-28 NOTE — LETTER
10/28/2021       RE: Mikaela Esquivel  [address]  [place] [address]     Dear Colleague,    Thank you for referring your patient, Mikaela Esquivel, to the [facility] UROLOGY CLINIC [place] at [facility]. Please see a copy of my visit note below.    Mikaela is a 41 year old who is being evaluated via a billable video visit.      How would you like to obtain your AVS? Mail a copy  If the video visit is dropped, the invitation should be resent by: Send to e-mail at: [email or web address]  Will anyone else be joining your video visit? No      Video Start Time: 7:31 AM  Video-Visit Details    Type of service:  Video Visit    Video End Time:7:47 AM    Originating Location (pt. Location): Home    Distant Location (provider location):  [facility] UROLOGY [facility]     Platform used for Video Visit: [employer]          Chief Complaint:   Follow up, post UDS         History of Present Illness:   Mikaela Esquivel is a 41 year old female with a history of bipolar disorder, depression and anxiety who presents for follow up of symptoms of overactive bladder.  The patient was initially seen for consult on 10/18/2021 at which time she endorsed a long standing history of overactive bladder and urinary urgency which has been ongoing for ~20 years, and is maintained on trospium 20 mg daily.  She currently reports urinary frequency and urgency, along with urge incontinence.  She also reports that she will leak urine throughout the day without feeling that she needs to void.  She feels she fully empties her bladder.  She denies any dysuria or gross hematuria.  She admits to nocturia 1-2 times per night, despite limiting fluids in the evening.  She denies any caffeine intake, though will occasionally have bubbly water.      She was subsequently referred for UDS which was completed on 10/21/2021 and showed normal bladder capacity (455 ml) with good  compliance without DO.  She emptied her bladder well (PVR 0 ml) with a continuous flow (Qmax 31 ml/s) and no evidence for bladder outlet obstruction.  She was noted to have some increased EMG activity during voiding, likely correlating with high tone pelvic floor.      Today she reports continued urinary symptoms of frequency and urgency.  She reports that she has an appointment scheduled with rheumatology in a couple months, and was recently changed to a different medication by her psychiatrist which is significantly helping her mood and she feels much more stable.  She does endorse chronic symptoms of constipation, and states she is currently having one bowel movement every three days.           Past Medical History:     Past Medical History:   Diagnosis Date     ASCUS favor benign 01/2013    Neg HR HPV     Cervical high risk HPV (human papillomavirus) test positive 7/2015, 01/07/19, 03/03/20 7/2015, 01/07/19, 03/03/20, 10/4/21     Depressive disorder, not elsewhere classified      Generalized anxiety disorder      Urgency of urination             Past Surgical History:     Past Surgical History:   Procedure Laterality Date     EYE SURGERY      LASIK     SURGICAL HISTORY OF -   2010    eye surgery            Medications     Current Outpatient Medications   Medication     ferrous gluconate (FERGON) 324 (38 Fe) MG tablet     lamoTRIgine (LAMICTAL) 25 MG tablet     OLANZapine (ZYPREXA) 5 MG tablet     QUEtiapine (SEROQUEL) 100 MG tablet     trospium (SANCTURA) 20 MG tablet     VITAMIN D PO     No current facility-administered medications for this visit.            Family History:     Family History   Adopted: Yes   Problem Relation Age of Onset     Unknown/Adopted Mother      Unknown/Adopted Father      Unknown/Adopted Maternal Grandmother      Family History Negative No family hx of         Pt adopted            Social History:     Social History     Socioeconomic History     Marital status: Single     Spouse  name: Not on file     Number of children: Not on file     Years of education: Not on file     Highest education level: Not on file   Occupational History     Not on file   Tobacco Use     Smoking status: Never Smoker     Smokeless tobacco: Never Used   Substance and Sexual Activity     Alcohol use: Yes     Comment: occasionally: 2 or less per week      Drug use: No     Sexual activity: Yes     Partners: Male     Birth control/protection: None, Condom   Other Topics Concern     Parent/sibling w/ CABG, MI or angioplasty before 65F 55M? No   Social History Narrative    Dairy/d 2-3 servings/d.     Caffeine 0 servings/d    Exercise 3 x week biking,play frisbee    Sunscreen used - Yes    Seatbelts used - Yes    Working smoke/CO detectors in the home - Yes    Guns stored in the home - No    Self Breast Exams - No    Self Testicular Exam - NOT APPLICABLE    Eye Exam up to date - yes    Dental Exam up to date - Yes    Pap Smear up to date - No    Mammogram up to date - NOT APPLICABLE    PSA up to date - NOT APPLICABLE    Dexa Scan up to date - NOT APPLICABLE    Flex Sig / Colonoscopy up to date - NOT APPLICABLE    Immunizations up to date - Yes-Td 2005    Abuse: Current or Past(Physical, Sexual or Emotional)- No    Do you feel safe in your environment - Yes     Social Determinants of Health     Financial Resource Strain:      Difficulty of Paying Living Expenses:    Food Insecurity:      Worried About Running Out of Food in the Last Year:      Ran Out of Food in the Last Year:    Transportation Needs:      Lack of Transportation (Medical):      Lack of Transportation (Non-Medical):    Physical Activity:      Days of Exercise per Week:      Minutes of Exercise per Session:    Stress:      Feeling of Stress :    Social Connections:      Frequency of Communication with Friends and Family:      Frequency of Social Gatherings with Friends and Family:      Attends Christian Services:      Active Member of Clubs or Organizations:       Attends Club or Organization Meetings:      Marital Status:    Intimate Partner Violence:      Fear of Current or Ex-Partner:      Emotionally Abused:      Physically Abused:      Sexually Abused:             Allergies:   Patient has no known allergies.         Review of Systems:  From intake questionnaire   Negative 14 system review except as noted on HPI, nurse's note.         Physical Exam:   Patient is a 41 year old  female   General Appearance Adult: Alert, no acute distress, oriented  Lungs: no respiratory distress, or pursed lip breathing  Heart: No obvious jugular venous distension present  Skin: no suspicious lesions or rashes  Neuro: Alert, oriented, speech and mentation normal  Further examination is deferred due to the nature of our visit.        Labs and Pathology:    I personally reviewed all applicable laboratory data and went over findings with patient  Significant for:    CBC RESULTS:  Recent Labs   Lab Test 09/09/21  1553 11/12/20  0745   WBC 8.4 7.1   HGB 12.6 13.8    254        BMP RESULTS:  Recent Labs   Lab Test 09/09/21  1552 11/12/20  0745 03/03/20  0837    139  --    POTASSIUM 3.4 3.9  --    CHLORIDE 107 107  --    CO2 27 25  --    ANIONGAP 6 7  --    GLC 89 96 104*   BUN 6* 9  --    CR 0.74 0.70  --    GFRESTIMATED >90 >90  --    GFRESTBLACK  --  >90  --    SUHAIL 8.8 9.3  --        UA RESULTS:   Recent Labs   Lab Test 10/21/21  0958 10/21/21  0953   SG 1.012 1.025   URINEPH 6.0 6.5   NITRITE Negative Negative   RBCU 1  --    WBCU <1  --          Imaging:    I personally reviewed all applicable imaging and went over findings with patient.  Significant for: no recent relevant imaging          Assessment and Plan:     Assessment: 41 year old female with long standing symptoms of urinary frequency, urgency, and incontinence.  Recent UDS with normal bladder capacity, compliance, flow, and emptying.  No evidence of detrusor overactivity or stress incontinence, and no evidence of  bladder outlet obstruction.  She was noted to have some increased EMG activity during voiding, likely correlating with high tone pelvic floor.  Discussed referral to pelvic floor physical therapy for treatment, and patient in agreement.  Referral order placed for PFPT.  Patient also with baseline constipation, which is likely also contributing to her irritative urinary symptoms.  Discussed management of constipation, including hydration, increased fiber, and Miralax.  Would recommend continuing trospium at this time; if symptoms significantly improve with PFPT, then could consider discontinuing the trospium and monitor symptoms.  Patient in agreement with plan.     Plan:  - Proceed with pelvic floor physical therapy.   - Follow up with me in 3-4 months for recheck.  If symptoms are significantly improved with the PFPT, then could cancel appointment if no concerns.   - Continue trospium 20 mg daily.   - Treat constipation with hydration, fiber supplement, and Miralax.       ARJUN Haley  Department of Urology

## 2021-11-08 NOTE — PROGRESS NOTES
Clinic Care Coordination Contact  Community Health Worker Follow Up    Care Gaps:     There are no preventive care reminders to display for this patient.    Currently there are no Care Gaps.    Goals:   Goals Addressed as of 11/8/2021 at 3:30 PM                    Today       Financial Wellbeing (pt-stated)   10%    Added 10/1/21 by Natalie Pinto LSW      Goal Statement: I want to have good mental health for the next 4 months  Date Goal set: 10/1/21  Barriers: psychosocial barriers  Strengths:medication compliant; willing to work [facility] therapy and psychiatry  Date to Achieve By: 02/1/21  Patient expressed understanding of goal: yes  Action steps to achieve this goal:  1. I will call the PHP program at [phone]    2. I will continue with psychiatry with Dr. Obrien- [facility]     3. I will start therapy once per week.       11/08/21 CHW    Patient states, she has not call PHP program. She doesn't think partial hospitalization program is necessary.     Patient states, she's followed up with Dr. Obrien many times and has one month follow up scheduled.    Patient states, therapy is going well, she's going once a week.        Intervention and Education during outreach: CHW inquired if patient needs any additional support or resources. Patient states, she's feeling overwhelmed with bills. She's starting to received medical bills in the mail and having a hard time organizing them. She's not sure who to pay. CHW informed patient that will reach out to [facility] to clarify if this is something the W team or billing can help with and will follow up with her.  Patient inquired about housing and shares that the people below have not moved out yet. CHW inquired if patient would like housing resources and patient replied yes. CHW informed patient that will also reach out to [facility] regarding housing resources and follow up with her tomorrow as [facility] is out of office today. Patient agreed.     CHW Plan: CHW to follow up in 1-2 business days.  CHW to reach out to [facility] for support.     11/10/21 CHW consulted with JONAH Young regarding above follow up with patient. Natalie will reach out to PCP to discuss further on plan. CHW to assist as needed.     CHW Plan: CHW to follow up in 1 week or as needed per clinician.    [facility]  [facility]    Phone: [phone]  ____________________    Next Outreach:  11/17/21  Planned Outreach Frequency: Monthly  Preferred Phone Number: [phone]     Enrollment Date:  10/01/21  Last Care Plan Assessment:  10/01/21

## 2021-11-17 NOTE — PROGRESS NOTES
Care Coordination Clinician Chart Review  Situation: Patient chart reviewed by care coordinator.       Background: Care Coordination initial assessment and enrollment to Care Coordination was successful.   Patient centered goals were developed with participation from patient.  RAYMOND CC handed patient off to CHW for continued outreach every 30 days.        Assessment: Per chart review, patient outreach completed by CC CHW on 11/8/21.  Patient is actively working to accomplish goals.  Patient's goals remain appropriate and relevant at this time.   Patient is not yet due for updated Plan of Care.  Annual assessment will be due 10/22.      Goals        Financial Wellbeing (pt-stated)       Goal Statement: I want to have good mental health for the next 4 months  Date Goal set: 10/1/21  Barriers: psychosocial barriers  Strengths:medication compliant; willing to work [facility] therapy and psychiatry  Date to Achieve By: 02/1/21  Patient expressed understanding of goal: yes  Action steps to achieve this goal:  1. I will call the [facility] program at [phone]    2. I will continue with psychiatry with Dr. Pa cordero     3. I will start therapy once per week.                       Plan/Recommendations: The patient will continue working with Care Coordination to achieve goals as above.      Note in chart that patient will be getting psych meds renewed by her psychiatry provider.    CHW will involve RAYMOND MERRITT as needed or if patient is ready to move to maintenance.  RAYMOND CC will continue to monitor progress to goals and CHW outreaches every 6 weeks.   Plan of Care updated and mailed to patient: Crystal Pinto [facility] Care Coordination  Tel: [phone]

## 2021-11-19 NOTE — PROGRESS NOTES
Clinic Care Coordination Contact  [facility]/Voicemail    Clinical Data: Care Coordinator Outreach  Outreach attempted x 1.  Left message on patient's voicemail with call back information and requested return call.    Plan: Care Coordinator will try to reach patient again in 10 business days.    Marlene Peralta  [facility] Care Coordination  [facility]'s, and [facility]    Phone: [phone]  ____________________    Next Outreach:  11/26/21  Planned Outreach Frequency: Monthly  Preferred Phone Number: [phone]    Enrollment Date:  10/01/21  Last Care Plan Assessment:  10/01/21

## 2021-12-03 PROBLEM — N39.46 MIXED INCONTINENCE: Status: ACTIVE | Noted: 2021-01-01

## 2021-12-03 PROBLEM — M99.05 PELVIC SOMATIC DYSFUNCTION: Status: ACTIVE | Noted: 2021-01-01

## 2021-12-03 PROBLEM — R35.0 URINARY FREQUENCY: Status: ACTIVE | Noted: 2021-01-01

## 2021-12-03 NOTE — PROGRESS NOTES
"Physical Therapy Initial Evaluation  Subjective:    Patient Health History  Mikaela Esquivel being seen for Overactive Bladder.     Problem began: 1/15/2010.   Problem occurred: I am not sure.   Pain is reported as 0/10 on pain scale.  General health as reported by patient is good.  Pertinent medical history includes: changes in bowel/bladder and mental illness.     Medical allergies: none.   Surgeries include:  None.    Current medications:  Anti-depressants.    Current occupation is [employer].   Primary job tasks include:  Lifting/carrying.                SUBJECTIVE:  Patient referred to PT for pelvic floor therapy.  Symptoms include constantly feeling anxious that she has to go to the bathroom.  Pt reports having to time everything around use of toilet..  Pt recently had a manic episode on 9/9/2021 and was diagnosed with Bipolar 1.  Pt is currently on new medication.  Pt had UD study done and was able to demo normal emptying and PVR of 0.  Reports show she did have some nonrelaxation of PFM.  Pt does report history of contipation.  She will have 3 BMs per week.  Last BM was probably 3 days ago.  She does note feeling bloated.  She denies fecal incontinence.  She does report having rare urinary stress and urge incontinence.  Since onset symptoms have been getting better, worse or staying the same? Same.  Pt goal is to \"have more control over when I have to urinate\".    Urination:  Do you leak on the way to the bathroom or with a strong urge to void? Yes - sometimes   Do you leak with cough,sneeze, jumping, running?Yes - sometimes, maybe 1 x a week or less  Any other activities that cause leaking? no  Do you have triggers that make you feel you can't wait to go to the bathroom? Yes   what are they:  Drinking  A lot of water.  Type of pad and number used per day? none  When you leak what is the amount? small    How long can you delay the need to urinate? 5 min, reports she can wait 3 hours between voids.   How many times " do you get up to urinate at night? 1  Can you stop the flow of urine when on the toilet? Yes - I think  Is the volume of urine passed usually: depends on how much she has had to drink, can be small or large. (8sec rule=  250ml with average bladder storing  400-600ml)    Do you strain to pass urine? No  Do you have a slow or hesitant urinary stream? No  Do you have difficulty initiating the urine stream? Yes  Do you feel you fully empty your blader?  yes    How many bladder infections have you had in last 12 months?0    Fluid intake(one glass is 8oz or one cup) 2 glasses/day, 0 caffinated glasses/day  0 alcohol glasses/day,    Bowel habits:  Frequency of bowel movements?3 times a/week  Consistancy of stool? soft formed, Coal Stool Scale sometimes can be type 2-3  Do you ignore the urge to defecate? No  Do you strain to pass stool? Yes    Pelvic Pain:  When do you have pelvic pain? NA  Is initial penetration during intercourse painful? No  Is deeper penetration painful? No  Do you use lubricant? no   Given birth? No, never been pregnant  Are you sexually active?Yes  Have you ever been worried for your physical safety? No  Any abdominal or pelvic surgeries? none  Are you having any regular exercise?yes - walking  Have you practiced the PF(kegel) exercises for 4 or more weeks?no             Objective:  System         Lumbar/SI Evaluation  ROM:  AROM Lumbar: normal                                                Pelvic Dysfunction Evaluation:        Flexibility:    Tightness present at:Adductors; Piriformis and Gluteals    Abdominal Wall:  Abdominal wall pelvic: hypomobile lower abdominal fascial mob and more tight over transverse colon.        Pelvic Clock Exam:  Pelvic clock exam: no tenderness to palpation but patient reports pressure with LA palpation.    Ischiocavernosis pain:  -  Bulbocavernosis pain:  -  Transverse Perineal:  -  Levator ANI:  +/-  Perineal Body:  -      External Assessment:  External assessment  pelvic: bulge noted with cough.  Skin Condition:  Normal      Tissue Symmetry:  Normal  Introitus:  Normal  Muscle Contraction/Perineal Mobility:  Elevation and urogential triangle descent  Internal Assessment:  Internal assessment pelvic: able to feel a good lift, delayed relaxation palpated.  pt does hold her breath a little with PFM contraction.    Contraction/Grade:  Weak squeeze, 2 second hold (2)          SEMG Biofeedback:    Equipment:  MR20    Suraface electrode placement--Perianal:  Yes  Baseline EMG PM:  Supine 1.2 uV, sitting 1.1 uV.  Pt demo nonrelaxation of PFM and paradoxic coordination when asked to similate urination/BM    Peak pelvic muscle contraction:  19.8 uV    EMG interpretation to fatigue:  5-8 seconds  Position:  Sitting                     General     ROS    Assessment/Plan:    Patient is a 41 year old female with pelvic complaints.    Patient has the following significant findings with corresponding treatment plan.                Diagnosis 1:  Pelvic floor dysfunction with mixed incontinence and urinary urgency/frequency  Decreased ROM/flexibility - manual therapy, therapeutic exercise, therapeutic activity and home program  Impaired muscle performance - biofeedback, neuro re-education and home program  Decreased function - therapeutic activities and home program    Therapy Evaluation Codes:   1) History comprised of:   Personal factors that impact the plan of care:      Anxiety and Time since onset of symptoms.    Comorbidity factors that impact the plan of care are:      Bowel/bladder changes and Mental illness.     Medications impacting care: Anti-depressant.  2) Examination of Body Systems comprised of:   Body structures and functions that impact the plan of care:      Pelvis.   Activity limitations that impact the plan of care are:      Frequency, Stress incontinence, Urgency and Urge incontinence.  3) Clinical presentation characteristics  are:   Stable/Uncomplicated.  4) Decision-Making    Low complexity using standardized patient assessment instrument and/or measureable assessment of functional outcome.  Cumulative Therapy Evaluation is: Low complexity.    Previous and current functional limitations:  (See Goal Flow Sheet for this information)    Short term and Long term goals: (See Goal Flow Sheet for this information)     Communication ability:  Patient appears to be able to clearly communicate and understand verbal and written communication and follow directions correctly.  Treatment Explanation - The following has been discussed with the patient:   RX ordered/plan of care  Anticipated outcomes  Possible risks and side effects  This patient would benefit from PT intervention to resume normal activities.   Rehab potential is good.    Frequency:  1 X week, once daily  Duration:  for 6 weeks  Discharge Plan:  Achieve all LTG.  Independent in home treatment program.  Reach maximal therapeutic benefit.    Please refer to the daily flowsheet for treatment today, total treatment time and time spent performing 1:1 timed codes.

## 2021-12-03 NOTE — PROGRESS NOTES
Clinic Care Coordination Contact  Community Health Worker Follow Up    Care Gaps:     There are no preventive care reminders to display for this patient.    Currently there are no Care Gaps.    Goals:   Goals Addressed as of 12/3/2021 at 2:13 PM                    Today    11/8/21       Financial Wellbeing (pt-stated)   20%  10%    Added 10/1/21 by Natalie Pinto LSW      Goal Statement: I want to have good mental health for the next 4 months  Date Goal set: 10/1/21  Barriers: psychosocial barriers  Strengths:medication compliant; willing to work [facility] therapy and psychiatry  Date to Achieve By: 02/1/21  Patient expressed understanding of goal: yes  Action steps to achieve this goal:  1. I will call the [facility] program at [phone] (Patient declined)  2. I will continue with psychiatry with Dr. Obrien- one     3. I will start therapy once per week.       12/3/21 CHW    Patient states, she's seeing therapist Selena Davis at [facility] weekly. Dr. Obrien is at same clinic and managing medications. Patient will reach out to CC if any support is needed.         Intervention and Education during outreach: CHW inquired if patient is still need of housing resources. Patient states, she no longer needs housing resources and will be staying at current address for awhile. CHW inquired about medical bills and if patient needs support with organizing and setting up payment plan however patient declined. Patient shares that medical bill statements are in her emails and she does not get paper statements. She'd have to find all of the emails first and will notify CC if she needs any assistance. CHW encouraged that patient reach out as needed for support. CHW inquired if patient needs any additional support or resources however patient declined.     CHW Plan: CHW to follow up in 1 month    [facility] Care Coordination  [facility], and [facility]    Phone:  [phone]  ____________________    Next Outreach:  1/03/22  Planned Outreach Frequency: Monthly  Preferred Phone Number: [phone]    Enrollment Date:  10/01/21  Last Care Plan Assessment:  10/01/21

## 2021-12-22 NOTE — PROGRESS NOTES
Care Coordination Clinician Chart Review  Situation: Patient chart reviewed by care coordinator.       Background: Care Coordination initial assessment and enrollment to Care Coordination was successful.   Patient centered goals were developed with participation from patient.  RAYMOND CC handed patient off to CHW for continued outreach every 30 days.        Assessment: Per chart review, patient outreach completed by CC CHW on 12/3/21.  Patient is actively working to accomplish goals.  Patient's goals remain appropriate and relevant at this time.   Patient is not yet due for updated Plan of Care.  Annual assessment will be due 10/2022.      Goals        Financial Wellbeing (pt-stated)       Goal Statement: I want to have good mental health for the next 4 months  Date Goal set: 10/1/21  Barriers: psychosocial barriers  Strengths:medication compliant; willing to work [facility] therapy and psychiatry  Date to Achieve By: 02/1/21  Patient expressed understanding of goal: yes  Action steps to achieve this goal:  1. I will call the [facility] program at [phone]   2. I will continue with psychiatry with Dr. Pa cordero     3. I will start therapy once per week.                       Plan/Recommendations: The patient will continue working with Care Coordination to achieve goals as above.  CHW will involve RAYMOND MERRITT as needed or if patient is ready to move to maintenance.  RAYMOND CC will continue to monitor progress to goals and CHW outreaches every 6 weeks.   Plan of Care updated and mailed to patient: Crystal Pinto [facility] Care Coordination  Tel: [phone]

## 2022-01-01 ENCOUNTER — PATIENT OUTREACH (OUTPATIENT)
Dept: CARE COORDINATION | Facility: CLINIC | Age: 42
End: 2022-01-01
Payer: COMMERCIAL

## 2022-01-01 ENCOUNTER — PATIENT OUTREACH (OUTPATIENT)
Dept: NURSING | Facility: CLINIC | Age: 42
End: 2022-01-01
Payer: COMMERCIAL

## 2022-01-26 NOTE — PROGRESS NOTES
Clinic Care Coordination Contact  [facility]/Voicemail       Clinical Data: Care Coordinator Outreach  Outreach attempted x 1.  Left message on patient's voicemail with call back information and requested return call.  Plan:Care Coordinator will try to reach patient again in 2 weeks.    Natalie Pinto ROSELIA   [facility] Care Coordination  Tel: [phone]

## 2022-01-26 NOTE — LETTER
[facility]  Patient Centered Plan of Care  About Me:        Patient Name:  Mikaela Lawrence    YOB: 1980  Age:         42 year old   Jazmín MRN:    [id number] Telephone Information:  Home Phone [phone]   Mobile [phone]       Address:  [address]  [place] [address] Email address:  [email or web address]      Emergency Contact(s)    Name Relationship Lgl Grd Work Phone Home Phone Mobile Phone   PHYLLIS CM Mother   [phone]    2. ANGIE LAWRENCE                Primary language:  English     needed? No   [employer]:  [phone] op. 1  Other communication barriers:No data recorded  Preferred Method of Communication:  Phone  Current living arrangement: I live alone    Mobility Status/ Medical Equipment: Independent        Health Maintenance  Health Maintenance Reviewed: Due/Overdue   Health Maintenance Due   Topic Date Due     PHQ-2  01/01/2022           My Access Plan  Medical Emergency 911   Primary Clinic Line   -     24 Hour Appointment Line [phone] or  [phone]) (toll-free)   24 Hour Nurse Line [phone] (toll-free)   Preferred Urgent Care No data recorded   Preferred Hospital [facility]  [phone]     Preferred Pharmacy [id number]     Behavioral Health Crisis Line The National Suicide Prevention Lifeline at 1-223.722.1624 or 911             My Care Team Members  Patient Care Team       Relationship Specialty Notifications Start End    Susan Booker NP PCP - General   5/11/07     Phone: [phone] Fax: [phone]         [address] [place] [facility] [address]    Siva Kirby DO Referring Physician Family Practice  7/15/15     REFERRING TO ORTHOPEDICS    Phone: [phone] Fax: [phone]         [address] [address]    Susan Cardoso MD MD Family Medicine -  Sports Medicine  7/15/15     Phone: [phone] Fax: [phone]         [address] [facility] [address]    Mireille Walters MD Assigned OBGYN Provider   10/23/20     Phone: [phone] Fax: [phone]         [address] [place] [address]    Idris Coleman MD Assigned Neuroscience Provider   11/29/20     Phone: [phone] Fax: [phone] [address] [place] [address]    Susan Booker NP Assigned PCP   4/25/21     Phone: [phone] Fax: [phone] [address] [facility] [address]    Natalie Pinto LSW Lead Care Coordinator Primary Care - CC Admissions 10/1/21     Phone: [phone]         Deysi Munroe PA Physician Assistant Urology  10/5/21     Phone: [phone] Fax: [phone]         [address] [facility] [address]    Deysi Munroe PA Assigned Surgical Provider   10/24/21     Phone: [phone] Fax: [phone]         [address] [facility] [place] [address]            My Care Plans  Self Management and Treatment Plan  Goals and (Comments)  Goals        General     Financial Wellbeing (pt-stated)      Notes - Note edited  12/3/2021  2:12 PM by Marlene Peralta     Goal Statement: I want to have good mental health for the next 4 months  Date Goal set: 10/1/21  Barriers: psychosocial barriers  Strengths:medication compliant; willing to work [facility] therapy and psychiatry  Date to Achieve By: 02/1/21  Patient expressed understanding of goal: yes  Action steps to achieve this goal:  1. I will call the [facility] program at [phone]   2. I will continue with psychiatry with Dr. Pa cordero     3. I will start therapy once per week.                    Action Plans on File:                       Advance Care Plans/Directives Type:   No data recorded    My Medical and Care Information  Problem List   Patient Active Problem List   Diagnosis     Generalized anxiety disorder     Overactive bladder     Lipoma of skin and  subcutaneous tissue     Cervical high risk HPV (human papillomavirus) test positive     Psychosis, unspecified psychosis type (H)     Myopia     Bipolar 1 disorder (H)     Insomnia     Pelvic somatic dysfunction     Urinary frequency     Mixed incontinence      Current Medications and Allergies:  See printed Medication Report.    Care Coordination Start Date: No linked episodes   Frequency of Care Coordination: monthly     Form Last Updated: 01/26/2022

## 2022-02-09 NOTE — PROGRESS NOTES
Clinic Care Coordination Contact    Follow Up Progress Note      Assessment: SWCC connected with patient. Patient shares she is seeing psychiatry and seeing her therapist. Patient just completed 3 1/2 weeks of psych rehab and shares it was a helpful program but that she is experiencing increased anxiety since being at her parents. She has been at her parents since she graduated psych rehab. SWCC inquired what her coping mechanisms are and patient shares she doesn't have many. SWCC suggested meditation and offered resources. Patient was agreeable to giving this a try. Patient is also going to discuss with her care team.    Care Gaps:    Health Maintenance Due   Topic Date Due     PHQ-2  01/01/2022       Patient shared she will take care of on her next appt    Goals addressed this encounter:   Goals Addressed                    This Visit's Progress       Financial Wellbeing (pt-stated)   40%      Goal Statement: I want to have good mental health for the next 4 months  Date Goal set: 10/1/21  Barriers: psychosocial barriers  Strengths:medication compliant; willing to work [facility] therapy and psychiatry  Date to Achieve By: 02/1/21  Patient expressed understanding of goal: yes  Action steps to achieve this goal:  1. I will call the [facility] program at [phone]   2. I will continue with psychiatry with Dr. Pa cordero     3. I will start therapy once per week.                   Intervention/Education provided during outreach: SWCC contacted patient and used active listening as well as empathy with patient. Reviewed coping mechanisms and current treatment modalities.     Outreach Frequency: monthly    Plan: Pt will discuss her anxiety with her therapist and her psychiatrist.  Care Coordinator will follow up in 1 month.    MARYCRUZ Young   [facility] Care Coordination  Tel: [phone]

## 2022-03-15 ENCOUNTER — PATIENT OUTREACH (OUTPATIENT)
Dept: CARE COORDINATION | Facility: CLINIC | Age: 42
End: 2022-03-15
Payer: COMMERCIAL

## 2022-03-15 NOTE — PROGRESS NOTES
Patient's chart reviewed by [facility]. Patient is . Patient no longer a care coordination candidate    MARYCRUZ Young   [facility] Care Coordination  Tel: [phone]

## 2022-06-12 ENCOUNTER — HEALTH MAINTENANCE LETTER (OUTPATIENT)
Age: 42
End: 2022-06-12